# Patient Record
Sex: FEMALE | Race: WHITE | NOT HISPANIC OR LATINO | Employment: FULL TIME | URBAN - METROPOLITAN AREA
[De-identification: names, ages, dates, MRNs, and addresses within clinical notes are randomized per-mention and may not be internally consistent; named-entity substitution may affect disease eponyms.]

---

## 2023-04-24 LAB
ALANINE AMINOTRANSFERASE (SGPT) (U/L) IN SER/PLAS: 23 U/L (ref 7–45)
ALBUMIN (G/DL) IN SER/PLAS: 4.6 G/DL (ref 3.4–5)
ALKALINE PHOSPHATASE (U/L) IN SER/PLAS: 65 U/L (ref 33–110)
ANION GAP IN SER/PLAS: 10 MMOL/L (ref 10–20)
ASPARTATE AMINOTRANSFERASE (SGOT) (U/L) IN SER/PLAS: 16 U/L (ref 9–39)
BASOPHILS (10*3/UL) IN BLOOD BY AUTOMATED COUNT: 0.08 X10E9/L (ref 0–0.1)
BASOPHILS/100 LEUKOCYTES IN BLOOD BY AUTOMATED COUNT: 1.3 % (ref 0–2)
BILIRUBIN TOTAL (MG/DL) IN SER/PLAS: 0.3 MG/DL (ref 0–1.2)
CALCIDIOL (25 OH VITAMIN D3) (NG/ML) IN SER/PLAS: 30 NG/ML
CALCIUM (MG/DL) IN SER/PLAS: 9.6 MG/DL (ref 8.6–10.3)
CARBON DIOXIDE, TOTAL (MMOL/L) IN SER/PLAS: 29 MMOL/L (ref 21–32)
CHLORIDE (MMOL/L) IN SER/PLAS: 105 MMOL/L (ref 98–107)
CHOLESTEROL (MG/DL) IN SER/PLAS: 317 MG/DL (ref 0–199)
CHOLESTEROL IN HDL (MG/DL) IN SER/PLAS: 49.1 MG/DL
CHOLESTEROL/HDL RATIO: 6.5
CREATININE (MG/DL) IN SER/PLAS: 0.86 MG/DL (ref 0.5–1.05)
EOSINOPHILS (10*3/UL) IN BLOOD BY AUTOMATED COUNT: 0.35 X10E9/L (ref 0–0.7)
EOSINOPHILS/100 LEUKOCYTES IN BLOOD BY AUTOMATED COUNT: 5.7 % (ref 0–6)
ERYTHROCYTE DISTRIBUTION WIDTH (RATIO) BY AUTOMATED COUNT: 13.3 % (ref 11.5–14.5)
ERYTHROCYTE MEAN CORPUSCULAR HEMOGLOBIN CONCENTRATION (G/DL) BY AUTOMATED: 32.6 G/DL (ref 32–36)
ERYTHROCYTE MEAN CORPUSCULAR VOLUME (FL) BY AUTOMATED COUNT: 95 FL (ref 80–100)
ERYTHROCYTES (10*6/UL) IN BLOOD BY AUTOMATED COUNT: 4.56 X10E12/L (ref 4–5.2)
GFR FEMALE: 84 ML/MIN/1.73M2
GLUCOSE (MG/DL) IN SER/PLAS: 92 MG/DL (ref 74–99)
HEMATOCRIT (%) IN BLOOD BY AUTOMATED COUNT: 43.3 % (ref 36–46)
HEMOGLOBIN (G/DL) IN BLOOD: 14.1 G/DL (ref 12–16)
IMMATURE GRANULOCYTES/100 LEUKOCYTES IN BLOOD BY AUTOMATED COUNT: 0.2 % (ref 0–0.9)
LDL: 211 MG/DL (ref 0–99)
LEUKOCYTES (10*3/UL) IN BLOOD BY AUTOMATED COUNT: 6.1 X10E9/L (ref 4.4–11.3)
LYMPHOCYTES (10*3/UL) IN BLOOD BY AUTOMATED COUNT: 2.51 X10E9/L (ref 1.2–4.8)
LYMPHOCYTES/100 LEUKOCYTES IN BLOOD BY AUTOMATED COUNT: 40.9 % (ref 13–44)
MONOCYTES (10*3/UL) IN BLOOD BY AUTOMATED COUNT: 0.54 X10E9/L (ref 0.1–1)
MONOCYTES/100 LEUKOCYTES IN BLOOD BY AUTOMATED COUNT: 8.8 % (ref 2–10)
NEUTROPHILS (10*3/UL) IN BLOOD BY AUTOMATED COUNT: 2.64 X10E9/L (ref 1.2–7.7)
NEUTROPHILS/100 LEUKOCYTES IN BLOOD BY AUTOMATED COUNT: 43.1 % (ref 40–80)
NON HDL CHOLESTEROL: 268 MG/DL
PLATELETS (10*3/UL) IN BLOOD AUTOMATED COUNT: 300 X10E9/L (ref 150–450)
POTASSIUM (MMOL/L) IN SER/PLAS: 4.3 MMOL/L (ref 3.5–5.3)
PROTEIN TOTAL: 7.6 G/DL (ref 6.4–8.2)
SODIUM (MMOL/L) IN SER/PLAS: 140 MMOL/L (ref 136–145)
THYROTROPIN (MIU/L) IN SER/PLAS BY DETECTION LIMIT <= 0.05 MIU/L: 1.44 MIU/L (ref 0.44–3.98)
TRIGLYCERIDE (MG/DL) IN SER/PLAS: 287 MG/DL (ref 0–149)
UREA NITROGEN (MG/DL) IN SER/PLAS: 13 MG/DL (ref 6–23)
VLDL: 57 MG/DL (ref 0–40)

## 2023-04-25 NOTE — RESULT ENCOUNTER NOTE
Cholesterol is quite high- I recommend healthy diet low in fat, low in cholesterol, carbohydrate controlled diet (no more then 100 grams per day), regular exercise (150 minutes of activity per week that gets heart rate up and produces sweat. Achieving a healthy weight for height (BMI), alcohol consumption to no more then 1 drink per day for women and 2 drinks per day for men and no smoking. I would like to recheck levels in 3 months and if not improved would recommend starting a medication to help lower

## 2023-05-12 DIAGNOSIS — Z13.220 LIPID SCREENING: Primary | ICD-10-CM

## 2023-06-20 DIAGNOSIS — F41.9 ANXIETY: Primary | ICD-10-CM

## 2023-06-20 RX ORDER — LORAZEPAM 0.5 MG/1
0.5 TABLET ORAL 2 TIMES DAILY PRN
Qty: 60 TABLET | Refills: 2 | Status: SHIPPED | OUTPATIENT
Start: 2023-06-20 | End: 2023-12-22 | Stop reason: SDUPTHER

## 2023-06-20 RX ORDER — LORAZEPAM 0.5 MG/1
0.5 TABLET ORAL 2 TIMES DAILY PRN
COMMUNITY
End: 2023-06-20 | Stop reason: SDUPTHER

## 2023-08-28 ENCOUNTER — TELEPHONE (OUTPATIENT)
Dept: PRIMARY CARE | Facility: CLINIC | Age: 48
End: 2023-08-28
Payer: COMMERCIAL

## 2023-08-28 NOTE — TELEPHONE ENCOUNTER
Orly, [facility] is out and they asked her to contact her provider to see if it can be switched to something else. She receives her meds through the mail due to ins.

## 2023-09-29 ENCOUNTER — OFFICE VISIT (OUTPATIENT)
Dept: PRIMARY CARE | Facility: CLINIC | Age: 48
End: 2023-09-29
Payer: COMMERCIAL

## 2023-09-29 VITALS
WEIGHT: 187.4 LBS | HEIGHT: 64 IN | DIASTOLIC BLOOD PRESSURE: 72 MMHG | SYSTOLIC BLOOD PRESSURE: 118 MMHG | BODY MASS INDEX: 31.99 KG/M2 | HEART RATE: 70 BPM

## 2023-09-29 DIAGNOSIS — K21.9 GASTROESOPHAGEAL REFLUX DISEASE, UNSPECIFIED WHETHER ESOPHAGITIS PRESENT: ICD-10-CM

## 2023-09-29 DIAGNOSIS — Z79.899 HIGH RISK MEDICATION USE: ICD-10-CM

## 2023-09-29 DIAGNOSIS — E66.9 OBESITY (BMI 30-39.9): Primary | ICD-10-CM

## 2023-09-29 DIAGNOSIS — M54.6 ACUTE MIDLINE THORACIC BACK PAIN: ICD-10-CM

## 2023-09-29 PROBLEM — F41.9 ANXIETY: Status: ACTIVE | Noted: 2023-09-29

## 2023-09-29 PROBLEM — I49.3 PVC (PREMATURE VENTRICULAR CONTRACTION): Status: ACTIVE | Noted: 2023-09-29

## 2023-09-29 PROBLEM — E55.9 VITAMIN D DEFICIENCY: Status: ACTIVE | Noted: 2023-09-29

## 2023-09-29 PROBLEM — F41.0 PANIC ATTACKS: Status: ACTIVE | Noted: 2023-09-29

## 2023-09-29 LAB
AMPHETAMINE (PRESENCE) IN URINE BY SCREEN METHOD: NORMAL
BARBITURATES PRESENCE IN URINE BY SCREEN METHOD: NORMAL
BENZODIAZEPINE (PRESENCE) IN URINE BY SCREEN METHOD: NORMAL
CANNABINOIDS IN URINE BY SCREEN METHOD: NORMAL
COCAINE (PRESENCE) IN URINE BY SCREEN METHOD: NORMAL
DRUG SCREEN COMMENT URINE: NORMAL
FENTANYL URINE: NORMAL
OPIATES (PRESENCE) IN URINE BY SCREEN METHOD: NORMAL
OXYCODONE (PRESENCE) IN URINE BY SCREEN METHOD: NORMAL
PHENCYCLIDINE (PRESENCE) IN URINE BY SCREEN METHOD: NORMAL

## 2023-09-29 PROCEDURE — 80307 DRUG TEST PRSMV CHEM ANLYZR: CPT

## 2023-09-29 PROCEDURE — 99215 OFFICE O/P EST HI 40 MIN: CPT | Performed by: FAMILY MEDICINE

## 2023-09-29 RX ORDER — DULOXETIN HYDROCHLORIDE 30 MG/1
1 CAPSULE, DELAYED RELEASE ORAL DAILY
COMMUNITY
Start: 2021-03-17 | End: 2024-05-21 | Stop reason: SDUPTHER

## 2023-09-29 RX ORDER — OMEPRAZOLE 20 MG/1
20 CAPSULE, DELAYED RELEASE ORAL DAILY
Qty: 30 CAPSULE | Refills: 1 | Status: SHIPPED | OUTPATIENT
Start: 2023-09-29 | End: 2023-11-28

## 2023-09-29 RX ORDER — PHENTERMINE HYDROCHLORIDE 37.5 MG/1
37.5 TABLET ORAL
Qty: 30 TABLET | Refills: 0 | Status: SHIPPED | OUTPATIENT
Start: 2023-09-29 | End: 2023-10-29

## 2023-09-29 ASSESSMENT — ENCOUNTER SYMPTOMS
WEAKNESS: 0
NAUSEA: 0
SHORTNESS OF BREATH: 0
DIZZINESS: 0
COUGH: 0
FATIGUE: 0
VOMITING: 0
ARTHRALGIAS: 0
CONSTIPATION: 0
FEVER: 0
DYSPHORIC MOOD: 0
DIARRHEA: 0
HEADACHES: 0
BACK PAIN: 0
ABDOMINAL PAIN: 0
CHILLS: 0
NERVOUS/ANXIOUS: 1
MYALGIAS: 0
PALPITATIONS: 0

## 2023-09-29 NOTE — PROGRESS NOTES
Subjective   Patient ID: Cinthya Thomas is a 48 y.o. female who presents for Follow-up.    OARRS:  Nuria Andrea, MAYA-JACKSON on 9/29/2023  2:07 PM  I have personally reviewed the OARRS report for Cinthya Thomas. I have considered the risks of abuse, dependence, addiction and diversion and I believe that it is clinically appropriate for Cinthya Thomas to be prescribed this medication    Is the patient prescribed a combination of a benzodiazepine and opioid?  No    Last Urine Drug Screen / ordered today: Yes  No results found for this or any previous visit (from the past 8760 hour(s)).  Results are as expected.       Controlled Substance Agreement:  Date of the Last Agreement: 9/29/23  Reviewed Controlled Substance Agreement including but not limited to the benefits, risks, and alternatives to treatment with a Controlled Substance medication(s).    Benzodiazepines:  What is the patient's goal of therapy? To reduce situational anxiety  Is this being achieved with current treatment? yes    Activities of Daily Living:   Is your overall impression that this patient is benefiting (symptom reduction outweighs side effects) from benzodiazepine therapy? Yes     1. Physical Functioning: Better  2. Family Relationship: Better  3. Social Relationship: Better  4. Mood: Better  5. Sleep Patterns: Better  6. Overall Function: Better     Stimulants:   What is the patient's goal of therapy? To lose weight  Is this being achieved with current treatment? yes    Activities of Daily Living:   Is your overall impression that this patient is benefiting (symptom reduction outweighs side effects) from stimulant therapy? Yes     1. Physical Functioning: Same  2. Family Relationship: Same  3. Social Relationship: Same  4. Mood: Same  5. Sleep Patterns: Same  6. Overall Function: Same      Presents for follow up of anxiety with concerns of mid posterior back pain, is intermittent, was seen in ER for this and work up was  "unremarkable. Describes as midline between shoulder blades pain that radiates outward. Does have history of GERD and has been using TUMS for this. Uncertain of aggravating or relieving factors for back pain. Denies chest pain, shortness of breath, cough, orthopnea, nausea, vomiting. Describes the back pain was really bad the other day after she had eaten tacos but did not find relief with tums.     Intermittent thigh numbness that occurs with activity and is relieved with rest, feels this has started since she has had weight gain. Also has intermittent numbness in hands with activity and relieved with rest. Neither limit her activities    Is frustrated with weight gain that seemed to start after childbirth, was thin up until then. Goes to the gym 4 days a week 30 minutes to an hour each visit, restricts carbohydrates and sugars. Diet is very healthy. Weight gain is interfering with her social activities.         Review of Systems   Constitutional:  Negative for chills, fatigue and fever.   Eyes:  Negative for visual disturbance.   Respiratory:  Negative for cough and shortness of breath.    Cardiovascular:  Negative for chest pain and palpitations.   Gastrointestinal:  Negative for abdominal pain, constipation, diarrhea, nausea and vomiting.   Musculoskeletal:  Negative for arthralgias, back pain and myalgias.   Skin:  Negative for rash.   Neurological:  Negative for dizziness, syncope, weakness and headaches.   Psychiatric/Behavioral:  Negative for dysphoric mood. The patient is nervous/anxious.        Objective   /72   Ht 1.626 m (5' 4\")   Wt 85 kg (187 lb 6.4 oz)   BMI 32.17 kg/m²     Physical Exam  Constitutional:       Appearance: Normal appearance.   HENT:      Head: Normocephalic and atraumatic.   Cardiovascular:      Rate and Rhythm: Normal rate and regular rhythm.      Heart sounds: No murmur heard.     No gallop.   Pulmonary:      Effort: Pulmonary effort is normal. No respiratory distress.      " Breath sounds: Normal breath sounds.   Abdominal:      General: Bowel sounds are normal. There is no distension.      Tenderness: There is no abdominal tenderness.   Musculoskeletal:         General: Normal range of motion.   Skin:     General: Skin is warm and dry.      Findings: No lesion or rash.   Neurological:      General: No focal deficit present.      Mental Status: She is alert and oriented to person, place, and time. Mental status is at baseline.   Psychiatric:         Mood and Affect: Mood normal. Affect is tearful.         Behavior: Behavior normal.         Assessment/Plan   Problem List Items Addressed This Visit    None  Visit Diagnoses         Codes    Obesity (BMI 30-39.9)    -  Primary E66.9    Relevant Medications    phentermine (Adipex-P) 37.5 mg tablet    Gastroesophageal reflux disease, unspecified whether esophagitis present     K21.9    Relevant Medications    omeprazole (PriLOSEC) 20 mg DR capsule    Acute midline thoracic back pain     M54.6    High risk medication use     Z79.899    Relevant Orders    Drug Screen, Urine With Reflex to Confirmation

## 2023-09-29 NOTE — PATIENT INSTRUCTIONS
You have been prescribed: phentermine for weight loss.   This medication will decrease your appetite. I recommend trying a tracking connor like [employer] or [employer]    This medication may not be covered by insurance plan. Expect to pay $20-30 per month. Prior authorizations are not completed for this medication.    **PLEASE READ, VERY IMPORTANT**    *You need to be seen within 30 days of your last appointment for a new prescription.     *LOST AND MISPLACED PRESCRIPTIONS WILL NOT BE REPLACED. YOU WILL NEED TO WAIT 6 MONTHS TO RESUME THE MEDICATION    *This medication will cause a positive AMPHETAMINE result on a drug screen    Side Effects include: difficulty sleeping, dry mouth, constipation, increased heart rate blood pressure, heart palpitations.   For difficulty sleeping, take the medication in the am for difficulty sleeping. Try Melatonin as a sleep aide.  For constipation, take fiber or daily stool softeners for constipation, increase fluid intake.    Diet:  Keep a food journal and log everything you eat and drink. You can use a phone applications like [employer], [employer] it, a notebook, or the provided meal calendar.   Eat between 1500 to 1800 calories per day.   Have less than 100 grams of carbs per day. Carbs include: bread, pasta, cakes, cookies, rice, cereal, fruit drinks, juice, soda and fruit.   Have no more than 1 fruit per day.   Have 3 meals and 1 snack.   Each meal should have 3-4 oz of protein and vegetables.   Limit starches.    Exercise:   Aim for 30 minutes per day, 5 days per week to start.  Then increase to 60 minutes per day, 5 days a week.      Return to the clinic: 3-4 weeks

## 2023-10-30 ENCOUNTER — TELEPHONE (OUTPATIENT)
Dept: PRIMARY CARE | Facility: CLINIC | Age: 48
End: 2023-10-30
Payer: COMMERCIAL

## 2023-11-07 ENCOUNTER — APPOINTMENT (OUTPATIENT)
Dept: OBSTETRICS AND GYNECOLOGY | Facility: CLINIC | Age: 48
End: 2023-11-07
Payer: COMMERCIAL

## 2023-11-09 ENCOUNTER — OFFICE VISIT (OUTPATIENT)
Dept: PRIMARY CARE | Facility: CLINIC | Age: 48
End: 2023-11-09
Payer: COMMERCIAL

## 2023-11-09 VITALS
HEIGHT: 64 IN | WEIGHT: 185 LBS | BODY MASS INDEX: 31.58 KG/M2 | SYSTOLIC BLOOD PRESSURE: 120 MMHG | DIASTOLIC BLOOD PRESSURE: 82 MMHG | HEART RATE: 87 BPM

## 2023-11-09 DIAGNOSIS — E66.9 OBESITY (BMI 30-39.9): ICD-10-CM

## 2023-11-09 DIAGNOSIS — Z12.31 BREAST CANCER SCREENING BY MAMMOGRAM: Primary | ICD-10-CM

## 2023-11-09 PROCEDURE — 99214 OFFICE O/P EST MOD 30 MIN: CPT | Performed by: FAMILY MEDICINE

## 2023-11-09 RX ORDER — PHENTERMINE HYDROCHLORIDE 37.5 MG/1
37.5 TABLET ORAL
Qty: 30 TABLET | Refills: 0 | Status: SHIPPED | OUTPATIENT
Start: 2023-11-09 | End: 2024-02-28 | Stop reason: ALTCHOICE

## 2023-11-09 ASSESSMENT — ENCOUNTER SYMPTOMS
FATIGUE: 0
WEAKNESS: 0
CONSTIPATION: 0
MYALGIAS: 0
CHILLS: 0
DIZZINESS: 0
DYSPHORIC MOOD: 0
PALPITATIONS: 0
ARTHRALGIAS: 0
LOSS OF SENSATION IN FEET: 0
VOMITING: 0
SHORTNESS OF BREATH: 0
NAUSEA: 0
NERVOUS/ANXIOUS: 0
OCCASIONAL FEELINGS OF UNSTEADINESS: 0
ABDOMINAL PAIN: 0
HEADACHES: 0
DEPRESSION: 0
DIARRHEA: 0
FEVER: 0
COUGH: 0
BACK PAIN: 0

## 2023-11-09 ASSESSMENT — PATIENT HEALTH QUESTIONNAIRE - PHQ9
1. LITTLE INTEREST OR PLEASURE IN DOING THINGS: NOT AT ALL
SUM OF ALL RESPONSES TO PHQ9 QUESTIONS 1 AND 2: 0
2. FEELING DOWN, DEPRESSED OR HOPELESS: NOT AT ALL

## 2023-11-09 ASSESSMENT — COLUMBIA-SUICIDE SEVERITY RATING SCALE - C-SSRS
2. HAVE YOU ACTUALLY HAD ANY THOUGHTS OF KILLING YOURSELF?: NO
6. HAVE YOU EVER DONE ANYTHING, STARTED TO DO ANYTHING, OR PREPARED TO DO ANYTHING TO END YOUR LIFE?: NO
1. IN THE PAST MONTH, HAVE YOU WISHED YOU WERE DEAD OR WISHED YOU COULD GO TO SLEEP AND NOT WAKE UP?: NO

## 2023-11-09 NOTE — PROGRESS NOTES
Subjective   Patient ID: Cinthya Thomas is a 48 y.o. female who presents for Follow-up (Follow up).    OARRS:  MAYA Spears-CNP on 11/9/2023  4:44 PM  I have personally reviewed the OARRS report for Cinthya Thomas. I have considered the risks of abuse, dependence, addiction and diversion and I believe that it is clinically appropriate for Cinthya Thomas to be prescribed this medication    Is the patient prescribed a combination of a benzodiazepine and opioid?  No    Last Urine Drug Screen / ordered today: Yes  Recent Results (from the past 8760 hour(s))   Drug Screen, Urine With Reflex to Confirmation    Collection Time: 09/29/23  6:00 PM   Result Value Ref Range    DRUG SCREEN COMMENT URINE SEE BELOW     Amphetamine Screen, Urine PRESUMPTIVE NEGATIVE NEGATIVE    Barbiturate Screen, Urine PRESUMPTIVE NEGATIVE NEGATIVE    BENZODIAZEPINE (PRESENCE) IN URINE BY SCREEN METHOD PRESUMPTIVE NEGATIVE NEGATIVE    Cannabinoid Screen, Urine PRESUMPTIVE NEGATIVE NEGATIVE    Cocaine Screen, Urine PRESUMPTIVE NEGATIVE NEGATIVE    Fentanyl, Ur PRESUMPTIVE NEGATIVE NEGATIVE    Opiate Screen, Urine PRESUMPTIVE NEGATIVE NEGATIVE    Oxycodone Screen, Ur PRESUMPTIVE NEGATIVE NEGATIVE    PCP Screen, Urine PRESUMPTIVE NEGATIVE NEGATIVE     Results are as expected.       Controlled Substance Agreement:  Date of the Last Agreement: 9/29/23  Reviewed Controlled Substance Agreement including but not limited to the benefits, risks, and alternatives to treatment with a Controlled Substance medication(s).    Benzodiazepines:  What is the patient's goal of therapy? To reduce situational anxiety  Is this being achieved with current treatment? yes    Activities of Daily Living:   Is your overall impression that this patient is benefiting (symptom reduction outweighs side effects) from benzodiazepine therapy? Yes     1. Physical Functioning: Better  2. Family Relationship: Better  3. Social Relationship: Better  4. Mood:  Better  5. Sleep Patterns: Better  6. Overall Function: Better     Stimulants:   What is the patient's goal of therapy? To lose weight  Is this being achieved with current treatment? yes    Activities of Daily Living:   Is your overall impression that this patient is benefiting (symptom reduction outweighs side effects) from stimulant therapy? Yes     1. Physical Functioning: Same  2. Family Relationship: Same  3. Social Relationship: Same  4. Mood: Same  5. Sleep Patterns: Same  6. Overall Function: Same      anxiety is doing much better and is able to use ativan much less then she was previously. Is able to attend social settings without having to take lorazepam first.     Acid reflux-has been on omeprazole and is taking daily, has not had any heart burn and pain in back is gone. Discussed stopping the PPI and if symptoms return will refer to GI for evaluation for EGD.    Intermittent thigh numbness that occurs with activity and is relieved with rest, feels this has started since she has had weight gain. Also has intermittent numbness in hands with activity and relieved with rest. Neither limit her activities. These things remain the same, no worse    Was started on phentermine- is down 2 pounds since last visit. Continues to go to the gym 4 days a week 30 minutes to an hour each visit, restricts carbohydrates and sugars. Diet is very healthy. Feels more motivated, had decreased appetite and has not had any negative side effects         Review of Systems   Constitutional:  Negative for chills, fatigue and fever.   Eyes:  Negative for visual disturbance.   Respiratory:  Negative for cough and shortness of breath.    Cardiovascular:  Negative for chest pain and palpitations.   Gastrointestinal:  Negative for abdominal pain, constipation, diarrhea, nausea and vomiting.   Musculoskeletal:  Negative for arthralgias, back pain and myalgias.   Skin:  Negative for rash.   Neurological:  Negative for dizziness, syncope,  "weakness and headaches.   Psychiatric/Behavioral:  Negative for dysphoric mood. The patient is not nervous/anxious.        Objective   /82 (BP Location: Right arm, Patient Position: Sitting)   Pulse 87   Ht 1.626 m (5' 4\")   Wt 83.9 kg (185 lb)   BMI 31.76 kg/m²     Physical Exam  Constitutional:       Appearance: Normal appearance.   HENT:      Head: Normocephalic and atraumatic.   Cardiovascular:      Rate and Rhythm: Normal rate and regular rhythm.      Heart sounds: No murmur heard.     No gallop.   Pulmonary:      Effort: Pulmonary effort is normal. No respiratory distress.      Breath sounds: Normal breath sounds.   Abdominal:      General: Bowel sounds are normal. There is no distension.      Tenderness: There is no abdominal tenderness.   Musculoskeletal:         General: Normal range of motion.   Skin:     General: Skin is warm and dry.      Findings: No lesion or rash.   Neurological:      General: No focal deficit present.      Mental Status: She is alert and oriented to person, place, and time. Mental status is at baseline.   Psychiatric:         Mood and Affect: Mood normal. Affect is tearful.         Behavior: Behavior normal.         Assessment/Plan   Problem List Items Addressed This Visit    None  Visit Diagnoses         Codes    Breast cancer screening by mammogram    -  Primary Z12.31    Relevant Orders    BI mammo bilateral screening tomosynthesis    Obesity (BMI 30-39.9)     E66.9    Relevant Medications    phentermine (Adipex-P) 37.5 mg tablet    Other Relevant Orders    Follow Up In [facility] - PCP - Established             "

## 2023-11-09 NOTE — PATIENT INSTRUCTIONS
1. Breast cancer screening by mammogram  - BI mammo bilateral screening tomosynthesis; Future    2. Obesity (BMI 30-39.9)  - phentermine (Adipex-P) 37.5 mg tablet; Take 1 tablet (37.5 mg) by mouth once daily in the morning. Take before meals.  Dispense: 30 tablet; Refill: 0

## 2023-11-15 PROBLEM — Z01.419 ENCOUNTER FOR ANNUAL ROUTINE GYNECOLOGICAL EXAMINATION: Status: ACTIVE | Noted: 2023-11-15

## 2023-11-15 ASSESSMENT — ENCOUNTER SYMPTOMS
CONSTIPATION: 0
FATIGUE: 0
DIFFICULTY URINATING: 0
CHILLS: 0
SHORTNESS OF BREATH: 0
SLEEP DISTURBANCE: 0
DIZZINESS: 0
BACK PAIN: 0
ADENOPATHY: 0
UNEXPECTED WEIGHT CHANGE: 0
NERVOUS/ANXIOUS: 0
DYSURIA: 0
JOINT SWELLING: 0
COUGH: 0
BLOOD IN STOOL: 0
DYSPHORIC MOOD: 0
AGITATION: 0
HEMATURIA: 0
VOMITING: 0
FLANK PAIN: 0
CHEST TIGHTNESS: 0
FREQUENCY: 0
FEVER: 0
HEADACHES: 0
NAUSEA: 0
DIARRHEA: 0
ABDOMINAL PAIN: 0
MYALGIAS: 0

## 2023-11-16 NOTE — PROGRESS NOTES
Subjective   Patient ID: Cinthya Thomas is a 48 y.o. female who presents for No chief complaint on file..  HPI 48 y.o.  (  has had vasectomy ) here for yearly.  History of dysmenorrhea.  She says that she has not had a cycle since 2020.  However, she had a period  Last month and is saying completely normal 5 days normal flow no pain no cramping.    Review of Systems   Constitutional:  Negative for chills, fatigue, fever and unexpected weight change.   Respiratory:  Negative for cough, chest tightness and shortness of breath.    Cardiovascular:  Negative for chest pain.   Gastrointestinal:  Negative for abdominal pain, blood in stool, constipation, diarrhea, nausea and vomiting.   Genitourinary:  Negative for decreased urine volume, difficulty urinating, dysuria, enuresis, flank pain, frequency, genital sores, hematuria and urgency.        Mild incontinence    Musculoskeletal:  Negative for back pain, joint swelling and myalgias.   Skin:  Negative for rash.   Neurological:  Negative for dizziness and headaches.   Hematological:  Negative for adenopathy.   Psychiatric/Behavioral:  Negative for agitation, dysphoric mood and sleep disturbance. The patient is not nervous/anxious.        Objective   Physical Exam  Vitals reviewed.   Constitutional:       Appearance: Normal appearance.   HENT:      Head: Normocephalic and atraumatic.      Nose: Nose normal.   Cardiovascular:      Rate and Rhythm: Normal rate and regular rhythm.   Pulmonary:      Effort: Pulmonary effort is normal.      Breath sounds: Normal breath sounds.   Chest:      Chest wall: No mass.   Breasts:     Right: Normal.      Left: Normal.   Abdominal:      General: Abdomen is flat. Bowel sounds are normal. There is no distension.      Palpations: Abdomen is soft. There is no mass.   Genitourinary:     General: Normal vulva.      Vagina: Normal.      Cervix: Normal.      Uterus: Normal.       Adnexa: Right adnexa normal and left adnexa  normal.      Rectum: Normal.   Musculoskeletal:         General: Normal range of motion.      Cervical back: Normal range of motion.   Skin:     General: Skin is warm and dry.   Neurological:      General: No focal deficit present.      Mental Status: She is alert.   Psychiatric:         Mood and Affect: Mood normal.         Behavior: Behavior normal.       Assessment/Plan   Problem List Items Addressed This Visit             ICD-10-CM    Encounter for annual routine gynecological examination - Primary Z01.419     Pap smear was done today.  She saysthat a mammogram and Cologuard has been ordered by her primary care already.    She was encouraged to do self breast exam monthly,and  monitor her cycles for now.  If she has persistent prolonged heavy bleeding or persistent spotting then she should come back for evaluation otherwise follow-up is in 1 year.    She was encouraged to quit smoking, I offered her patches but to she is not likely to quit smoking at this point.  She was encouraged weightloss, healthydiet and exercise regularly.  Follow-up in 1 year.

## 2023-11-17 ENCOUNTER — OFFICE VISIT (OUTPATIENT)
Dept: OBSTETRICS AND GYNECOLOGY | Facility: CLINIC | Age: 48
End: 2023-11-17
Payer: COMMERCIAL

## 2023-11-17 VITALS
SYSTOLIC BLOOD PRESSURE: 140 MMHG | BODY MASS INDEX: 30.82 KG/M2 | DIASTOLIC BLOOD PRESSURE: 88 MMHG | HEIGHT: 65 IN | WEIGHT: 185 LBS

## 2023-11-17 DIAGNOSIS — Z01.419 ENCOUNTER FOR ANNUAL ROUTINE GYNECOLOGICAL EXAMINATION: Primary | ICD-10-CM

## 2023-11-17 PROCEDURE — 87624 HPV HI-RISK TYP POOLED RSLT: CPT

## 2023-11-17 PROCEDURE — 88175 CYTOPATH C/V AUTO FLUID REDO: CPT

## 2023-11-17 PROCEDURE — 99396 PREV VISIT EST AGE 40-64: CPT | Performed by: OBSTETRICS & GYNECOLOGY

## 2023-12-05 LAB
CYTOLOGY CMNT CVX/VAG CYTO-IMP: NORMAL
HPV HR 12 DNA GENITAL QL NAA+PROBE: NEGATIVE
HPV HR GENOTYPES PNL CVX NAA+PROBE: NEGATIVE
HPV16 DNA SPEC QL NAA+PROBE: NEGATIVE
HPV18 DNA SPEC QL NAA+PROBE: NEGATIVE
LAB AP HPV GENOTYPE QUESTION: YES
LAB AP HPV HR: NORMAL
LABORATORY COMMENT REPORT: NORMAL
PATH REPORT.TOTAL CANCER: NORMAL

## 2023-12-13 ENCOUNTER — APPOINTMENT (OUTPATIENT)
Dept: PRIMARY CARE | Facility: CLINIC | Age: 48
End: 2023-12-13
Payer: COMMERCIAL

## 2023-12-22 ENCOUNTER — TELEPHONE (OUTPATIENT)
Dept: PRIMARY CARE | Facility: CLINIC | Age: 48
End: 2023-12-22
Payer: COMMERCIAL

## 2023-12-22 DIAGNOSIS — F41.9 ANXIETY: ICD-10-CM

## 2023-12-22 RX ORDER — LORAZEPAM 0.5 MG/1
0.5 TABLET ORAL 2 TIMES DAILY PRN
Qty: 60 TABLET | Refills: 2 | Status: SHIPPED | OUTPATIENT
Start: 2023-12-22 | End: 2024-03-21

## 2023-12-22 NOTE — TELEPHONE ENCOUNTER
Refill request:  Lorazepam 0.5mg   1/2 tab as needed   Pharmacy: [facility] not mail order this time only

## 2024-01-24 ENCOUNTER — APPOINTMENT (OUTPATIENT)
Dept: PRIMARY CARE | Facility: CLINIC | Age: 49
End: 2024-01-24
Payer: COMMERCIAL

## 2024-01-24 DIAGNOSIS — F41.8 DEPRESSION WITH ANXIETY: ICD-10-CM

## 2024-01-24 RX ORDER — BUPROPION HYDROCHLORIDE 300 MG/1
TABLET ORAL
Qty: 90 TABLET | Refills: 3 | Status: SHIPPED | OUTPATIENT
Start: 2024-01-24

## 2024-02-09 ENCOUNTER — HOSPITAL ENCOUNTER (OUTPATIENT)
Dept: RADIOLOGY | Facility: HOSPITAL | Age: 49
Discharge: HOME | End: 2024-02-09
Payer: COMMERCIAL

## 2024-02-09 DIAGNOSIS — Z12.31 BREAST CANCER SCREENING BY MAMMOGRAM: ICD-10-CM

## 2024-02-09 PROCEDURE — 77063 BREAST TOMOSYNTHESIS BI: CPT | Performed by: RADIOLOGY

## 2024-02-09 PROCEDURE — 77067 SCR MAMMO BI INCL CAD: CPT | Performed by: RADIOLOGY

## 2024-02-09 PROCEDURE — 77067 SCR MAMMO BI INCL CAD: CPT

## 2024-02-28 ENCOUNTER — OFFICE VISIT (OUTPATIENT)
Dept: PRIMARY CARE | Facility: CLINIC | Age: 49
End: 2024-02-28
Payer: COMMERCIAL

## 2024-02-28 VITALS
SYSTOLIC BLOOD PRESSURE: 112 MMHG | WEIGHT: 191 LBS | HEIGHT: 64 IN | DIASTOLIC BLOOD PRESSURE: 78 MMHG | HEART RATE: 91 BPM | BODY MASS INDEX: 32.61 KG/M2

## 2024-02-28 DIAGNOSIS — Z13.220 LIPID SCREENING: ICD-10-CM

## 2024-02-28 DIAGNOSIS — R06.83 HABITUAL SNORING: Primary | ICD-10-CM

## 2024-02-28 DIAGNOSIS — Z00.00 HEALTHCARE MAINTENANCE: ICD-10-CM

## 2024-02-28 DIAGNOSIS — E66.9 OBESITY (BMI 30-39.9): ICD-10-CM

## 2024-02-28 PROCEDURE — 99213 OFFICE O/P EST LOW 20 MIN: CPT | Performed by: FAMILY MEDICINE

## 2024-02-28 ASSESSMENT — ANXIETY QUESTIONNAIRES
6. BECOMING EASILY ANNOYED OR IRRITABLE: NOT AT ALL
IF YOU CHECKED OFF ANY PROBLEMS ON THIS QUESTIONNAIRE, HOW DIFFICULT HAVE THESE PROBLEMS MADE IT FOR YOU TO DO YOUR WORK, TAKE CARE OF THINGS AT HOME, OR GET ALONG WITH OTHER PEOPLE: NOT DIFFICULT AT ALL
7. FEELING AFRAID AS IF SOMETHING AWFUL MIGHT HAPPEN: NOT AT ALL
2. NOT BEING ABLE TO STOP OR CONTROL WORRYING: SEVERAL DAYS
1. FEELING NERVOUS, ANXIOUS, OR ON EDGE: NEARLY EVERY DAY
5. BEING SO RESTLESS THAT IT IS HARD TO SIT STILL: SEVERAL DAYS
4. TROUBLE RELAXING: SEVERAL DAYS
3. WORRYING TOO MUCH ABOUT DIFFERENT THINGS: NEARLY EVERY DAY
GAD7 TOTAL SCORE: 9

## 2024-02-28 ASSESSMENT — COLUMBIA-SUICIDE SEVERITY RATING SCALE - C-SSRS
1. IN THE PAST MONTH, HAVE YOU WISHED YOU WERE DEAD OR WISHED YOU COULD GO TO SLEEP AND NOT WAKE UP?: NO
2. HAVE YOU ACTUALLY HAD ANY THOUGHTS OF KILLING YOURSELF?: NO
6. HAVE YOU EVER DONE ANYTHING, STARTED TO DO ANYTHING, OR PREPARED TO DO ANYTHING TO END YOUR LIFE?: NO

## 2024-02-28 ASSESSMENT — PATIENT HEALTH QUESTIONNAIRE - PHQ9
2. FEELING DOWN, DEPRESSED OR HOPELESS: NOT AT ALL
SUM OF ALL RESPONSES TO PHQ9 QUESTIONS 1 AND 2: 0
1. LITTLE INTEREST OR PLEASURE IN DOING THINGS: NOT AT ALL

## 2024-02-28 ASSESSMENT — ENCOUNTER SYMPTOMS
OCCASIONAL FEELINGS OF UNSTEADINESS: 0
DEPRESSION: 0
LOSS OF SENSATION IN FEET: 0

## 2024-02-28 NOTE — PROGRESS NOTES
"Subjective   Patient ID: Cinthya Thomas is a 48 y.o. female who presents for Follow-up (Patient here for follow up, stopped phentermine because she didn't like how she felt,snoring and talk about ativan).    Presents for follow up    Is no longer taking phentermine. Has stopped smoking. Has gained some weight but does endorse that she has been snacking more since she has stopped smoking. Continues to work out. Continues to take xanax as needed but has not needed it as often as she had been taking it. Does report her  has told her she has been snoring especially over the last 6 months. Does not drink alcohol or take sedatives before bed. Denies HA, visual changes, SOB, chest pain, lightheadedness or dizziness          Review of Systems   All other systems reviewed and are negative.      Objective   /78 (BP Location: Right arm, Patient Position: Sitting)   Pulse 91   Ht 1.626 m (5' 4\")   Wt 86.6 kg (191 lb)   BMI 32.79 kg/m²     Physical Exam  Constitutional:       Appearance: Normal appearance.   HENT:      Head: Normocephalic and atraumatic.   Cardiovascular:      Rate and Rhythm: Normal rate and regular rhythm.      Heart sounds: No murmur heard.     No gallop.   Pulmonary:      Effort: Pulmonary effort is normal. No respiratory distress.      Breath sounds: Normal breath sounds.   Musculoskeletal:         General: Normal range of motion.   Skin:     General: Skin is warm and dry.      Findings: No lesion or rash.   Neurological:      General: No focal deficit present.      Mental Status: She is alert and oriented to person, place, and time. Mental status is at baseline.   Psychiatric:         Mood and Affect: Mood normal.         Behavior: Behavior normal.         Assessment/Plan   Problem List Items Addressed This Visit    None  Visit Diagnoses         Codes    Habitual snoring    -  Primary R06.83    Relevant Orders    Home sleep apnea test (HSAT)    Obesity (BMI 30-39.9)     E66.9    Lipid " screening     Z13.220    Relevant Orders    Lipid Panel    Healthcare maintenance     Z00.00    Relevant Orders    TSH with reflex to Free T4 if abnormal    Comprehensive Metabolic Panel    CBC and Auto Differential    Hemoglobin A1C

## 2024-03-08 ENCOUNTER — CLINICAL SUPPORT (OUTPATIENT)
Dept: SLEEP MEDICINE | Facility: HOSPITAL | Age: 49
End: 2024-03-08
Payer: COMMERCIAL

## 2024-03-08 DIAGNOSIS — R06.83 HABITUAL SNORING: ICD-10-CM

## 2024-03-08 PROCEDURE — 95806 SLEEP STUDY UNATT&RESP EFFT: CPT | Performed by: INTERNAL MEDICINE

## 2024-03-19 ENCOUNTER — TELEPHONE (OUTPATIENT)
Dept: PRIMARY CARE | Facility: CLINIC | Age: 49
End: 2024-03-19
Payer: COMMERCIAL

## 2024-03-19 NOTE — TELEPHONE ENCOUNTER
----- Message from PETER Spears sent at 3/19/2024  1:28 PM EDT -----  Sleep study consistent for moderate-severe sleep apnea and positive airway pressure machine is indicated to treat. Please let me know if agreeable for this treatment and I will order or may return to the office for a follow up discussion and I can answer any questions.

## 2024-03-19 NOTE — RESULT ENCOUNTER NOTE
Sleep study consistent for moderate-severe sleep apnea and positive airway pressure machine is indicated to treat. Please let me know if agreeable for this treatment and I will order or may return to the office for a follow up discussion and I can answer any questions.

## 2024-03-20 DIAGNOSIS — G47.33 OSA (OBSTRUCTIVE SLEEP APNEA): Primary | ICD-10-CM

## 2024-05-17 ENCOUNTER — TELEPHONE (OUTPATIENT)
Dept: PRIMARY CARE | Facility: CLINIC | Age: 49
End: 2024-05-17
Payer: COMMERCIAL

## 2024-05-17 NOTE — TELEPHONE ENCOUNTER
Med Refill   DULoxetine (Cymbalta) 30 mg DR capsule [[id number]]     [facility] [address]  [address] [address]  Phone: [phone]  Fax: [phone]     LOV: 2/28/24    NOV: MELANIE

## 2024-05-21 DIAGNOSIS — F41.9 ANXIETY: Primary | ICD-10-CM

## 2024-05-21 RX ORDER — DULOXETIN HYDROCHLORIDE 30 MG/1
30 CAPSULE, DELAYED RELEASE ORAL DAILY
Qty: 90 CAPSULE | Refills: 3 | Status: SHIPPED | OUTPATIENT
Start: 2024-05-21 | End: 2025-05-21

## 2024-06-24 ENCOUNTER — APPOINTMENT (OUTPATIENT)
Dept: OBSTETRICS AND GYNECOLOGY | Facility: CLINIC | Age: 49
End: 2024-06-24
Payer: COMMERCIAL

## 2024-06-24 VITALS
WEIGHT: 194.4 LBS | HEIGHT: 64 IN | BODY MASS INDEX: 33.19 KG/M2 | DIASTOLIC BLOOD PRESSURE: 78 MMHG | SYSTOLIC BLOOD PRESSURE: 124 MMHG

## 2024-06-24 DIAGNOSIS — Z79.890 MENOPAUSAL SYNDROME ON HORMONE REPLACEMENT THERAPY: Primary | ICD-10-CM

## 2024-06-24 DIAGNOSIS — N95.1 MENOPAUSAL SYNDROME ON HORMONE REPLACEMENT THERAPY: Primary | ICD-10-CM

## 2024-06-24 PROCEDURE — 99204 OFFICE O/P NEW MOD 45 MIN: CPT | Performed by: NURSE PRACTITIONER

## 2024-06-24 RX ORDER — ESTRADIOL 0.05 MG/D
1 PATCH TRANSDERMAL
Qty: 4 PATCH | Refills: 11 | Status: SHIPPED | OUTPATIENT
Start: 2024-06-30 | End: 2024-06-25 | Stop reason: SDUPTHER

## 2024-06-24 RX ORDER — PROGESTERONE 100 MG/1
100 CAPSULE ORAL DAILY
Qty: 30 CAPSULE | Refills: 11 | Status: SHIPPED | OUTPATIENT
Start: 2024-06-24

## 2024-06-24 ASSESSMENT — ENCOUNTER SYMPTOMS
HEMATOLOGIC/LYMPHATIC NEGATIVE: 0
MUSCULOSKELETAL NEGATIVE: 0
ALLERGIC/IMMUNOLOGIC NEGATIVE: 0
CARDIOVASCULAR NEGATIVE: 0
RESPIRATORY NEGATIVE: 0
EYES NEGATIVE: 0
PSYCHIATRIC NEGATIVE: 0
GASTROINTESTINAL NEGATIVE: 0
CONSTITUTIONAL NEGATIVE: 0
ENDOCRINE NEGATIVE: 0
NEUROLOGICAL NEGATIVE: 0

## 2024-06-24 ASSESSMENT — PAIN SCALES - GENERAL: PAINLEVEL: 0-NO PAIN

## 2024-06-24 NOTE — PROGRESS NOTES
Subjective   Patient ID: Cinthya Thomas is a 49 y.o. female who presents for Menopause.  HPI  Concerns:   Symptoms started end of 2020: weight gain  Started yoga and weight training  Has worked with multiple providers  Normal TSH 4/2023    Menopausal age: perimenopausal  LMP 10/2023    Any Contraindications to HT: personal h/o breast cancer, estrogen sensitive cancer, dementia, stroke, MI, VTE or inherited high risk for VTE, SCAD: none  h/o congenital heart disease (increased risk of DVT) none    contraception:  vasectomy  HT: none  use of OTC remedies: none  bioidenticals: none  Was prescribed wellbutrin and ativan which she is still on; previously tried Cymbalta but had AE       VMS: yes  Sleep difficulties: yes; h/o sleep apnea  Mood changes: yes, on wellbutrin   Joint pain: yes  Brain fog/difficulty concentrating: yes    GSM: none  are you sexually active: , yes  dyspareunia: none  decrease in desire: yes; r/t negative body image  difficulty reaching orgasm:  yes  Urinary Incontinence: yes, urge         Fractures: none    Exercise:  yes  weight bearing: yes     Review of Systems    Objective   Physical Exam    Assessment/Plan   Diagnoses and all orders for this visit:  Menopausal syndrome on hormone replacement therapy  -     progesterone (Prometrium) 100 mg capsule; Take 1 capsule (100 mg) by mouth once daily. Take at bedtime  -     estradiol (Climara) 0.05 mg/24 hr patch; Place 1 patch over 7 days on the skin 1 (one) time per week.       Decision for MHT; transdermal estradiol d/t elevated triglycerides  Follow up in 6 weeks     MHT risks/benefits discussed. WHI discussed including that the average age of the women in the WHI study was 63 and it was studying if HT would reduce the risk of heart disease in women, it did not study QOL, GSM, osteoporosis, or VMS. Therefore, the findings are not necessarily applicable to women starting HT within the first 10 years of menopause.  The WHI did not find an  increase in breast cancer with combined therapy until after 3-5 years of use and there was no increased risk of breast cancer with estrogen only. However, a meta-analysis of observational cohort studies consistently found an increased risk of breast cancer in women on estrogen only therapy, especially after 5 years of use.    The effect of HT on heart disease may vary depending on a woman's age and time since menopause. Per the Menopause Society's position statement, data shows reduced heart disease in women who initiate HT less than 60 years old or are within 10 years of menopause onset. There is more concern for heart disease when HT is initiated in women more than 10-20 years since menopause onset.    In women who are perimenopausal or within 10 years of menopause; the benefits of HT are likely to outweigh the risks.     Alin Fernando, MAYA-CNP 06/24/24 10:45 AM

## 2024-06-25 ENCOUNTER — TELEPHONE (OUTPATIENT)
Dept: OBSTETRICS AND GYNECOLOGY | Facility: CLINIC | Age: 49
End: 2024-06-25
Payer: COMMERCIAL

## 2024-06-25 DIAGNOSIS — Z79.890 MENOPAUSAL SYNDROME ON HORMONE REPLACEMENT THERAPY: ICD-10-CM

## 2024-06-25 DIAGNOSIS — N95.1 MENOPAUSAL SYNDROME ON HORMONE REPLACEMENT THERAPY: ICD-10-CM

## 2024-06-25 RX ORDER — ESTRADIOL 0.05 MG/D
1 PATCH TRANSDERMAL
Qty: 4 PATCH | Refills: 11 | Status: SHIPPED | OUTPATIENT
Start: 2024-06-25 | End: 2025-06-25

## 2024-07-23 DIAGNOSIS — F41.9 ANXIETY: ICD-10-CM

## 2024-07-23 RX ORDER — LORAZEPAM 0.5 MG/1
0.5 TABLET ORAL 2 TIMES DAILY PRN
Qty: 60 TABLET | Refills: 0 | Status: SHIPPED | OUTPATIENT
Start: 2024-07-23 | End: 2024-10-21

## 2024-08-09 ENCOUNTER — APPOINTMENT (OUTPATIENT)
Dept: OBSTETRICS AND GYNECOLOGY | Facility: CLINIC | Age: 49
End: 2024-08-09
Payer: COMMERCIAL

## 2024-08-09 DIAGNOSIS — Z79.890 MENOPAUSAL SYNDROME ON HORMONE REPLACEMENT THERAPY: Primary | ICD-10-CM

## 2024-08-09 DIAGNOSIS — N95.1 MENOPAUSAL SYNDROME ON HORMONE REPLACEMENT THERAPY: Primary | ICD-10-CM

## 2024-08-09 PROCEDURE — 99441 PR PHYS/QHP TELEPHONE EVALUATION 5-10 MIN: CPT | Performed by: NURSE PRACTITIONER

## 2024-08-09 RX ORDER — ESTRADIOL 0.05 MG/D
1 FILM, EXTENDED RELEASE TRANSDERMAL 2 TIMES WEEKLY
Qty: 24 PATCH | Refills: 3 | Status: SHIPPED | OUTPATIENT
Start: 2024-08-09 | End: 2025-08-09

## 2024-08-09 RX ORDER — ESTRADIOL 0.05 MG/D
1 FILM, EXTENDED RELEASE TRANSDERMAL 2 TIMES WEEKLY
Qty: 8 PATCH | Refills: 11 | Status: SHIPPED | OUTPATIENT
Start: 2024-08-12 | End: 2024-08-09

## 2024-08-09 NOTE — PROGRESS NOTES
"Subjective   Patient ID: Cinthya Thomas is a 49 y.o. female who presents for No chief complaint on file..  HPI  Follow up from 6/2024:  Perimenopausal  VMS: yes  Sleep difficulties: yes; h/o sleep apnea  Mood changes: yes, on wellbutrin   Joint pain: yes  Brain fog/difficulty concentrating: yes    I prescribed her MP 100mg po at HS, estradiol 0.05mg patch  Transdermal estradiol d/t elevated triglycerides    Today she states:  \"Things are going well\"  Occasionally patch doesn't stay on  VMS \"have gone down a ton\"  Sleep: \"I'm sleeping wonderfully\"  Overall very happy with the MHT and no AE    Review of Systems    Objective   Physical Exam    Assessment/Plan   Diagnoses and all orders for this visit:  Menopausal syndrome on hormone replacement therapy  -     estradiol (Vivelle-DOT) 0.05 mg/24 hr patch; Place 1 patch over 96 hours on the skin 2 times a week.       Will change to twice weekly patch to help with the patch adhering  Follow up annually or PRN    MAYA Ochoa-CNP 08/09/24 10:39 AM   "

## 2024-09-18 ENCOUNTER — PATIENT MESSAGE (OUTPATIENT)
Dept: PRIMARY CARE | Facility: CLINIC | Age: 49
End: 2024-09-18
Payer: COMMERCIAL

## 2024-09-18 DIAGNOSIS — F32.A DEPRESSIVE DISORDER: Primary | ICD-10-CM

## 2024-09-18 RX ORDER — DULOXETIN HYDROCHLORIDE 30 MG/1
30 CAPSULE, DELAYED RELEASE ORAL DAILY
Qty: 90 CAPSULE | Refills: 0 | Status: SHIPPED | OUTPATIENT
Start: 2024-09-18 | End: 2024-12-17

## 2024-11-19 ENCOUNTER — APPOINTMENT (OUTPATIENT)
Dept: OBSTETRICS AND GYNECOLOGY | Facility: CLINIC | Age: 49
End: 2024-11-19
Payer: COMMERCIAL

## 2024-11-21 ENCOUNTER — APPOINTMENT (OUTPATIENT)
Dept: OBSTETRICS AND GYNECOLOGY | Facility: CLINIC | Age: 49
End: 2024-11-21
Payer: COMMERCIAL

## 2024-12-26 DIAGNOSIS — F32.A DEPRESSIVE DISORDER: ICD-10-CM

## 2024-12-26 RX ORDER — DULOXETIN HYDROCHLORIDE 30 MG/1
30 CAPSULE, DELAYED RELEASE ORAL DAILY
Qty: 90 CAPSULE | Refills: 0 | Status: SHIPPED | OUTPATIENT
Start: 2024-12-26 | End: 2025-03-26

## 2025-01-07 ENCOUNTER — APPOINTMENT (OUTPATIENT)
Dept: PRIMARY CARE | Facility: CLINIC | Age: 50
End: 2025-01-07
Payer: COMMERCIAL

## 2025-01-07 ENCOUNTER — TELEPHONE (OUTPATIENT)
Dept: PRIMARY CARE | Facility: CLINIC | Age: 50
End: 2025-01-07

## 2025-01-07 VITALS
BODY MASS INDEX: 33.49 KG/M2 | HEART RATE: 94 BPM | WEIGHT: 201 LBS | DIASTOLIC BLOOD PRESSURE: 78 MMHG | SYSTOLIC BLOOD PRESSURE: 118 MMHG | HEIGHT: 65 IN | OXYGEN SATURATION: 94 %

## 2025-01-07 DIAGNOSIS — Z13.1 SCREENING FOR DIABETES MELLITUS: ICD-10-CM

## 2025-01-07 DIAGNOSIS — Z00.00 HEALTH CARE MAINTENANCE: ICD-10-CM

## 2025-01-07 DIAGNOSIS — Z12.31 ENCOUNTER FOR SCREENING MAMMOGRAM FOR MALIGNANT NEOPLASM OF BREAST: ICD-10-CM

## 2025-01-07 DIAGNOSIS — Z13.29 SCREENING FOR THYROID DISORDER: ICD-10-CM

## 2025-01-07 DIAGNOSIS — F41.9 ANXIETY: ICD-10-CM

## 2025-01-07 DIAGNOSIS — Z12.11 COLON CANCER SCREENING: Primary | ICD-10-CM

## 2025-01-07 DIAGNOSIS — Z79.890 MENOPAUSAL SYNDROME ON HORMONE REPLACEMENT THERAPY: ICD-10-CM

## 2025-01-07 DIAGNOSIS — N95.1 MENOPAUSAL SYNDROME ON HORMONE REPLACEMENT THERAPY: ICD-10-CM

## 2025-01-07 DIAGNOSIS — Z13.220 SCREENING CHOLESTEROL LEVEL: ICD-10-CM

## 2025-01-07 DIAGNOSIS — Z71.3 WEIGHT LOSS COUNSELING, ENCOUNTER FOR: ICD-10-CM

## 2025-01-07 DIAGNOSIS — G47.33 OSA (OBSTRUCTIVE SLEEP APNEA): ICD-10-CM

## 2025-01-07 DIAGNOSIS — E55.9 VITAMIN D DEFICIENCY: ICD-10-CM

## 2025-01-07 DIAGNOSIS — F32.A DEPRESSIVE DISORDER: ICD-10-CM

## 2025-01-07 DIAGNOSIS — F41.8 DEPRESSION WITH ANXIETY: ICD-10-CM

## 2025-01-07 DIAGNOSIS — E66.811 OBESITY (BMI 30.0-34.9): ICD-10-CM

## 2025-01-07 DIAGNOSIS — K21.9 GASTROESOPHAGEAL REFLUX DISEASE, UNSPECIFIED WHETHER ESOPHAGITIS PRESENT: ICD-10-CM

## 2025-01-07 PROBLEM — F41.0 PANIC ATTACKS: Status: RESOLVED | Noted: 2023-09-29 | Resolved: 2025-01-07

## 2025-01-07 PROCEDURE — 99214 OFFICE O/P EST MOD 30 MIN: CPT

## 2025-01-07 PROCEDURE — 90656 IIV3 VACC NO PRSV 0.5 ML IM: CPT

## 2025-01-07 PROCEDURE — 3008F BODY MASS INDEX DOCD: CPT

## 2025-01-07 PROCEDURE — 90471 IMMUNIZATION ADMIN: CPT

## 2025-01-07 PROCEDURE — 1036F TOBACCO NON-USER: CPT

## 2025-01-07 PROCEDURE — 99396 PREV VISIT EST AGE 40-64: CPT

## 2025-01-07 RX ORDER — OMEPRAZOLE 20 MG/1
20 CAPSULE, DELAYED RELEASE ORAL DAILY
Qty: 90 CAPSULE | Refills: 3 | Status: SHIPPED | OUTPATIENT
Start: 2025-01-07 | End: 2026-01-07

## 2025-01-07 RX ORDER — BUPROPION HYDROCHLORIDE 300 MG/1
300 TABLET ORAL
Qty: 90 TABLET | Refills: 3 | Status: SHIPPED | OUTPATIENT
Start: 2025-01-07 | End: 2026-01-07

## 2025-01-07 RX ORDER — DULOXETIN HYDROCHLORIDE 30 MG/1
30 CAPSULE, DELAYED RELEASE ORAL DAILY
Qty: 90 CAPSULE | Refills: 3 | Status: SHIPPED | OUTPATIENT
Start: 2025-01-07 | End: 2026-01-07

## 2025-01-07 ASSESSMENT — ENCOUNTER SYMPTOMS
PSYCHIATRIC NEGATIVE: 1
LOSS OF SENSATION IN FEET: 0
OCCASIONAL FEELINGS OF UNSTEADINESS: 0
CARDIOVASCULAR NEGATIVE: 1
ALLERGIC/IMMUNOLOGIC NEGATIVE: 1
HEMATOLOGIC/LYMPHATIC NEGATIVE: 1
DEPRESSION: 0
MUSCULOSKELETAL NEGATIVE: 1
CONSTITUTIONAL NEGATIVE: 1
ENDOCRINE NEGATIVE: 1
GASTROINTESTINAL NEGATIVE: 1
EYES NEGATIVE: 1
NEUROLOGICAL NEGATIVE: 1
RESPIRATORY NEGATIVE: 1

## 2025-01-07 ASSESSMENT — PATIENT HEALTH QUESTIONNAIRE - PHQ9
2. FEELING DOWN, DEPRESSED OR HOPELESS: NOT AT ALL
1. LITTLE INTEREST OR PLEASURE IN DOING THINGS: NOT AT ALL
SUM OF ALL RESPONSES TO PHQ9 QUESTIONS 1 AND 2: 0

## 2025-01-07 NOTE — ASSESSMENT & PLAN NOTE
Continues on duloxetine and Wellbutrin daily. OSBALDO score of 9, Denies any concerns today, feels medication is doing well.

## 2025-01-07 NOTE — ASSESSMENT & PLAN NOTE
Continues on duloxetine and Wellbutrin daily. PHQ score 0, Denies any concerns today, feels medication is doing well.

## 2025-01-07 NOTE — ASSESSMENT & PLAN NOTE
[facility]     19  Suzette Lux : 1980 Sex: male  Age: 41 y.o. Chief Complaint   Patient presents with    Chills    Generalized Body Aches    Fatigue       HPI  Patient presents today to express care complaining of 2 days worth of chills coughing productive for thick brown mucus. Has not checked his temperature but feels that he had fever. Pulse ox is 97% on room air. No history of respiratory issues including pneumonia. No one else around him is been sick. States he has been very uncomfortable with the chilling. Review of Systems   Constitutional: Positive for appetite change, chills, fatigue and fever. Negative for activity change, diaphoresis and unexpected weight change. HENT: Positive for congestion. Negative for ear pain, hearing loss, postnasal drip, rhinorrhea, sinus pain and sore throat. Respiratory: Positive for cough. Negative for shortness of breath and wheezing. Cardiovascular: Negative for chest pain, palpitations and leg swelling. Gastrointestinal: Negative for abdominal pain, constipation, diarrhea, nausea and vomiting. Endocrine: Negative. Genitourinary: Negative for difficulty urinating, dysuria and frequency. Musculoskeletal: Negative for arthralgias, back pain, gait problem and myalgias. Skin: Negative. Neurological: Negative for dizziness, light-headedness and headaches. Hematological: Negative. REST OF PERTINENT ROS GONE OVER AND WAS NEGATIVE. Current Outpatient Medications:     azithromycin (ZITHROMAX) 250 MG tablet, Take 1 tablet by mouth See Admin Instructions for 5 days 500mg on day 1 followed by 250mg on days 2 - 5, Disp: 6 tablet, Rfl: 0  No Known Allergies    Past Medical History:   Diagnosis Date    Cancer ([facility] [address]     Lymphoma     Past Surgical History:   Procedure Laterality Date    TUNNELED VENOUS PORT PLACEMENT      Removed    WRIST FRACTURE SURGERY Right 2017     History reviewed.  No pertinent family Lab work ordered  Mammogram ordered  Colonoscopy ordered  Influenza vaccine in OV today   exudate. Neck: Normal range of motion. Neck supple. No thyromegaly present. Cardiovascular: Normal rate, regular rhythm and normal heart sounds. No murmur heard. Pulmonary/Chest: Effort normal. No respiratory distress. He has no wheezes. Few scattered rhonchi in the bases bilaterally   Abdominal: Soft. Bowel sounds are normal. He exhibits no distension. There is no tenderness. Musculoskeletal: Normal range of motion. He exhibits no edema or tenderness. Lymphadenopathy:     He has no cervical adenopathy. He has no axillary adenopathy. Right: No inguinal adenopathy present. Left: No inguinal adenopathy present. Neurological: He is alert and oriented to person, place, and time. No sensory deficit. He exhibits normal muscle tone. Skin: Skin is warm and dry. No rash noted. Psychiatric: He has a normal mood and affect. His behavior is normal.   Nursing note and vitals reviewed. Assessment and Plan:  Antionette Simpson was seen today for chills, generalized body aches and fatigue. Diagnoses and all orders for this visit:    Cough  -     cefTRIAXone (ROCEPHIN) injection 500 mg  -     azithromycin (ZITHROMAX) 250 MG tablet; Take 1 tablet by mouth See Admin Instructions for 5 days 500mg on day 1 followed by 250mg on days 2 - 5  -     XR CHEST STANDARD (2 VW); Future  -     CBC Auto Differential; Future  -     CULTURE BLOOD #1; Future  -     CULTURE BLOOD #2; Future    Bronchitis  -     cefTRIAXone (ROCEPHIN) injection 500 mg  -     azithromycin (ZITHROMAX) 250 MG tablet; Take 1 tablet by mouth See Admin Instructions for 5 days 500mg on day 1 followed by 250mg on days 2 - 5  -     XR CHEST STANDARD (2 VW); Future  -     CBC Auto Differential; Future  -     CULTURE BLOOD #1; Future  -     CULTURE BLOOD #2; Future    Chills  -     cefTRIAXone (ROCEPHIN) injection 500 mg  -     azithromycin (ZITHROMAX) 250 MG tablet;  Take 1 tablet by mouth See Admin Instructions for 5 days 500mg on day 1 followed by 250mg on days 2 - 5  -     XR CHEST STANDARD (2 VW); Future  -     CBC Auto Differential; Future  -     CULTURE BLOOD #1; Future  -     CULTURE BLOOD #2; Future    Clinically suspecting pneumonia in this gentleman. Into the hospital for stat keep and call chest x-ray. Shot of Rocephin 500 mg. Z-Mir began. I did send him over to the lab for blood cultures and CBC. He does not have a PCP and I told him he will need to follow-up in the next week if not establish with somebody to come back to express care. To the emergency room over the weekend if any worsening. Notify us with problems in the interim. Addendum-I did get a call from radiology chest x-ray was read as negative. No follow-ups on file. Seen By:  Owen Peralta MD      *Document was created using voice recognition software. Note was reviewed however may contain grammatical errors.

## 2025-01-07 NOTE — PATIENT INSTRUCTIONS
I am ordering labs for you to get when you are fasting (meaning nothing to eat or drink for at least 12 hours before, water and black coffee are okay). Once we get the results, someone from the office will call you. If you do not hear from someone within one week of having your blood drawn, please call us [phone] so that we can go over the results.    It is recommend that you consume a balanced diet to include: fruits, a variety of vegetables (dark green, red and orange, legumes like beans and peas, starch, other), grains (at least half of which are whole grains), fat-free or low-fat dairy including milk,cheese, or fortified soy beverages, and proteins such as lean means, poultry, fish, eggs, nuts, seeds.   Limit processed foods, saturated and trans fats, added sugars and sodium (salt).     It is recommended that you get at least 150min exercise every week. More is even better. Moderate activities are activities that get your heart beating faster but you are still able to carry on a conversation. Vigorous activities will have you take breaths after a few words. You should also include two days of muscle strengthening activities to include all major muscle groups (arms, shoulders, chest, abdomen, back, hips and legs)    Benefits of keeping active include:  Lowering your risk of developing type II diabetes and some cancers  Control your blood pressure  Maintain a healthy weight  Improving mood and managing stress  Improving sleep

## 2025-01-07 NOTE — TELEPHONE ENCOUNTER
[facility] says they will not service patient, she was deemed non complaint and she needs to pay the $450 for CPAP machine

## 2025-01-07 NOTE — ASSESSMENT & PLAN NOTE
Had CPAP but has not used for the last two months due to the face mask is not sealing. Needs new face mask and tubing but has been unsuccessful in getting it per patient. Does endorse not using continues to have day time sleepiness, snores at night. Order in to replace mask or have new fitting completed.

## 2025-01-07 NOTE — PROGRESS NOTES
"Subjective   Patient ID: Cinthya Thomas is a 49 y.o. female who presents for Annual Exam (Annual exam, does not take ativan anymore and does not want refills. Discuss weight loss shot and CPAP. Would like colonoscopy or cologuard. ).    Past Medical, Surgical, and Family History reviewed and updated in chart.     Reviewed all medications by prescribing practitioner or clinical pharmacist (such as prescriptions, OTCs, herbal therapies and supplements) and documented in the medical record.    HPI   49 yof in office for annual exam. PMH anxiety, depression    On HRT patches but states perimenopause symptoms have been better. Has been a year last November since last period. Has been using CBD gummies to help also that have helped with symptoms.     Has CPAP machine, stopped using for the last two months due to the face mask is not sealing. Needs new face mask and tubing but has been unsuccessful in getting it per patient. Does endorse not using continues to have day time sleepiness, snores at night.     Discuss weight loss. Endorses has been eating good, increasing activity. Has tried phentermine in the past but would like to try something different. Dicussed GPL1 medication and patient is interested. Education provided on these medications.     Review of Systems   Constitutional: Negative.    HENT: Negative.     Eyes: Negative.    Respiratory: Negative.     Cardiovascular: Negative.    Gastrointestinal: Negative.    Endocrine: Negative.    Genitourinary: Negative.    Musculoskeletal: Negative.    Skin: Negative.    Allergic/Immunologic: Negative.    Neurological: Negative.    Hematological: Negative.    Psychiatric/Behavioral: Negative.     All other systems reviewed and are negative.      Objective   /78   Pulse 94   Ht 1.646 m (5' 4.8\")   Wt 91.2 kg (201 lb)   SpO2 94%   BMI 33.65 kg/m²     Physical Exam  Constitutional:       Appearance: Normal appearance. She is obese.   HENT:      Head: Normocephalic " and atraumatic.      Nose: Nose normal.      Mouth/Throat:      Mouth: Mucous membranes are moist.      Pharynx: Oropharynx is clear.   Eyes:      Pupils: Pupils are equal, round, and reactive to light.   Cardiovascular:      Rate and Rhythm: Normal rate and regular rhythm.      Pulses: Normal pulses.      Heart sounds: Normal heart sounds.   Pulmonary:      Effort: Pulmonary effort is normal.      Breath sounds: Normal breath sounds.   Abdominal:      General: Bowel sounds are normal.      Palpations: Abdomen is soft.   Musculoskeletal:         General: Normal range of motion.      Cervical back: Normal range of motion.   Skin:     General: Skin is warm and dry.   Neurological:      General: No focal deficit present.      Mental Status: She is alert and oriented to person, place, and time.   Psychiatric:         Mood and Affect: Mood normal.         Behavior: Behavior normal.         Thought Content: Thought content normal.         Judgment: Judgment normal.         Assessment/Plan   Problem List Items Addressed This Visit       Anxiety     Continues on duloxetine and Wellbutrin daily. OSBALDO score of 9, Denies any concerns today, feels medication is doing well.          Depressive disorder     Continues on duloxetine and Wellbutrin daily. PHQ score 0, Denies any concerns today, feels medication is doing well.          Relevant Medications    DULoxetine (Cymbalta) 30 mg DR capsule    Vitamin D deficiency     Continues on vitamin D supplement daily. Due to lab work, will order today.          Health care maintenance     Lab work ordered  Mammogram ordered  Colonoscopy ordered  Influenza vaccine in OV today         Relevant Orders    Follow Up In [facility] - PCP - Established    Menopausal syndrome on hormone replacement therapy     Follows with GYN is on HRT and states is doing well.          HADLEY (obstructive sleep apnea)     Had CPAP but has not used for the last two months due to the face mask is not  sealing. Needs new face mask and tubing but has been unsuccessful in getting it per patient. Does endorse not using continues to have day time sleepiness, snores at night. Order in to replace mask or have new fitting completed.          Relevant Orders    Positive Airway Pressure (PAP) Therapy     Other Visit Diagnoses       Colon cancer screening    -  Primary    Relevant Orders    Colonoscopy Screening; Average Risk Patient    Depression with anxiety        Relevant Medications    buPROPion XL (Wellbutrin XL) 300 mg 24 hr tablet    Gastroesophageal reflux disease, unspecified whether esophagitis present        Relevant Medications    omeprazole (PriLOSEC) 20 mg DR capsule    Encounter for screening mammogram for malignant neoplasm of breast        Relevant Orders    BI mammo bilateral screening tomosynthesis    BMI 33.0-33.9,adult        Relevant Medications    tirzepatide, weight loss, (Zepbound) 2.5 mg/0.5 mL injection    tirzepatide, weight loss, (Zepbound) 5 mg/0.5 mL injection (Start on 2/3/2025)    Other Relevant Orders    CBC and Auto Differential    Comprehensive Metabolic Panel    Screening cholesterol level        Relevant Orders    Lipid Panel    Screening for diabetes mellitus        Relevant Orders    Hemoglobin A1C    Screening for thyroid disorder        Relevant Orders    TSH with reflex to Free T4 if abnormal    Weight loss counseling, encounter for        Relevant Medications    tirzepatide, weight loss, (Zepbound) 2.5 mg/0.5 mL injection    tirzepatide, weight loss, (Zepbound) 5 mg/0.5 mL injection (Start on 2/3/2025)    Obesity (BMI 30.0-34.9)        Relevant Medications    tirzepatide, weight loss, (Zepbound) 2.5 mg/0.5 mL injection    tirzepatide, weight loss, (Zepbound) 5 mg/0.5 mL injection (Start on 2/3/2025)           Patient Counseling:  --Nutrition: Stressed importance of moderation in sodium/caffeine intake, saturated fat and cholesterol, caloric balance, sufficient intake of fresh  fruits, vegetables, fiber, calcium, iron  --Exercise: Stressed the importance of regular exercise.   --Substance Abuse: Discussed cessation/primary prevention of tobacco, alcohol, or other drug use; driving or other dangerous activities under the influence; availability of treatment for abuse.    --Sexuality: Discussed sexually transmitted diseases, partner selection, use of condoms, avoidance of unintended pregnancy  and contraceptive alternatives.   --Injury prevention: Discussed safety belts, safety helmets, smoke detector, smoking near bedding or upholstery.   --Dental health: Discussed importance of regular tooth brushing, flossing, and dental visits.  --Immunizations reviewed.  --Discussed benefits of screening colonoscopy.  --After hours service discussed with patient

## 2025-01-07 NOTE — TELEPHONE ENCOUNTER
----- Message from Mindi Martini sent at 1/7/2025  1:36 PM EST -----  I put in an ordered for CPAP supplies for patient can you fax it over please    Quality 431: Preventive Care And Screening: Unhealthy Alcohol Use - Screening: Patient not identified as an unhealthy alcohol user when screened for unhealthy alcohol use using a systematic screening method Detail Level: Detailed Quality 110: Preventive Care And Screening: Influenza Immunization: Influenza Immunization Administered during Influenza season Quality 226: Preventive Care And Screening: Tobacco Use: Screening And Cessation Intervention: Patient screened for tobacco use and is an ex/non-smoker Quality 130: Documentation Of Current Medications In The Medical Record: Current Medications Documented Quality 358: Patient-Centered Surgical Risk Assessment And Communication: Documentation of patient-specific risk assessment with a risk calculator based on multi-institutional clinical data, the specific risk calculator used, and communication of risk assessment from risk calculator with the patient or family.

## 2025-01-08 ENCOUNTER — TELEPHONE (OUTPATIENT)
Facility: CLINIC | Age: 50
End: 2025-01-08
Payer: COMMERCIAL

## 2025-01-13 DIAGNOSIS — N95.1 MENOPAUSAL SYNDROME ON HORMONE REPLACEMENT THERAPY: ICD-10-CM

## 2025-01-13 DIAGNOSIS — Z79.890 MENOPAUSAL SYNDROME ON HORMONE REPLACEMENT THERAPY: ICD-10-CM

## 2025-01-14 RX ORDER — ESTRADIOL 0.05 MG/D
1 FILM, EXTENDED RELEASE TRANSDERMAL 2 TIMES WEEKLY
Qty: 24 PATCH | Refills: 3 | Status: SHIPPED | OUTPATIENT
Start: 2025-01-16 | End: 2026-01-16

## 2025-01-27 NOTE — TELEPHONE ENCOUNTER
[facility] OPEN ACCESS COLONOSCOPY SCREENING FORM       Last Colonoscopy? no  ANESTHESIA SCREENING   1. Height 5'4     Weight 180  BMI 30.8    (Clinic Visit if BMI over 40)   2. Are you on any blood thinning medications including  mg? no  ( Clinic visit if yes)  3. Are you on oxygen at home? no (Clinic visit if yes)   4. Have you seen your primary care provider within the last 12 months? 1.7.2025 (Clinic visit if NO)   5. History of:   Pacemaker/Defibrillator no                          Heart Failure no                         Heart Disease no                          Stroke no   Details of cardiac history: no  (Clinic visit if history of heart failure or new diagnosis/new intervention in last year)     6. Do you have renal failure or require dialysis? no  7. Do you have sleep Apnea? Yes, CPAP  8. Are you on insulin for diabetes? no If yes, patient should discuss valente-procedural medication adjustment with PCP.   9. Are you currently on SGLT2 inhibitors? Zepbound  10. Do you have a history of seizures? no (If YES- indicate recent or NOT recent. Anesthesia to review if recent.)    GI SCREENING   Have you had a positive stool based screening test? (i.e. fecal occult, FIT, or Cologuard) no   ? If yes and pass anesthesia screen, no further questions are needed. Schedule OA procedure.   ? If yes and they do NOT pass anesthesia screen then refer to office for expedited review/visit.   ? If no, proceed to the following GI screening questions to determine if office visit is needed.      If patient answers YES to any of the following please send to the office for an appointment.  1. Do you have chronic diarrhea lasting longer than 3 weeks? no   2. Do you have a large amount of rectal bleeding or frequent rectal bleeding? no  3. Do you have significant, unexplained weight loss? no      Open Access APPROVED yes    Patient scheduled with Dr. Ritchie 2.18.2025. Packet with [employer] sent via [employer]

## 2025-02-10 ENCOUNTER — APPOINTMENT (OUTPATIENT)
Dept: RADIOLOGY | Facility: HOSPITAL | Age: 50
End: 2025-02-10
Payer: COMMERCIAL

## 2025-02-10 DIAGNOSIS — Z71.3 WEIGHT LOSS COUNSELING, ENCOUNTER FOR: ICD-10-CM

## 2025-02-10 DIAGNOSIS — E66.811 OBESITY (BMI 30.0-34.9): ICD-10-CM

## 2025-02-18 ENCOUNTER — ANESTHESIA (OUTPATIENT)
Dept: GASTROENTEROLOGY | Facility: HOSPITAL | Age: 50
End: 2025-02-18
Payer: COMMERCIAL

## 2025-02-18 ENCOUNTER — HOSPITAL ENCOUNTER (OUTPATIENT)
Dept: GASTROENTEROLOGY | Facility: HOSPITAL | Age: 50
Discharge: HOME | End: 2025-02-18
Payer: COMMERCIAL

## 2025-02-18 ENCOUNTER — ANESTHESIA EVENT (OUTPATIENT)
Dept: GASTROENTEROLOGY | Facility: HOSPITAL | Age: 50
End: 2025-02-18
Payer: COMMERCIAL

## 2025-02-18 VITALS
TEMPERATURE: 97.7 F | WEIGHT: 200 LBS | DIASTOLIC BLOOD PRESSURE: 63 MMHG | BODY MASS INDEX: 33.32 KG/M2 | HEART RATE: 62 BPM | RESPIRATION RATE: 12 BRPM | HEIGHT: 65 IN | OXYGEN SATURATION: 99 % | SYSTOLIC BLOOD PRESSURE: 107 MMHG

## 2025-02-18 DIAGNOSIS — Z12.11 COLON CANCER SCREENING: ICD-10-CM

## 2025-02-18 PROCEDURE — 45385 COLONOSCOPY W/LESION REMOVAL: CPT | Performed by: INTERNAL MEDICINE

## 2025-02-18 PROCEDURE — 7100000010 HC PHASE TWO TIME - EACH INCREMENTAL 1 MINUTE

## 2025-02-18 PROCEDURE — 7100000009 HC PHASE TWO TIME - INITIAL BASE CHARGE

## 2025-02-18 PROCEDURE — 3700000001 HC GENERAL ANESTHESIA TIME - INITIAL BASE CHARGE

## 2025-02-18 PROCEDURE — 3700000002 HC GENERAL ANESTHESIA TIME - EACH INCREMENTAL 1 MINUTE

## 2025-02-18 PROCEDURE — 2500000004 HC RX 250 GENERAL PHARMACY W/ HCPCS (ALT 636 FOR OP/ED): Performed by: NURSE ANESTHETIST, CERTIFIED REGISTERED

## 2025-02-18 RX ORDER — SODIUM CHLORIDE 0.9 % (FLUSH) 0.9 %
SYRINGE (ML) INJECTION AS NEEDED
Status: DISCONTINUED | OUTPATIENT
Start: 2025-02-18 | End: 2025-02-18

## 2025-02-18 RX ORDER — LIDOCAINE HYDROCHLORIDE 20 MG/ML
INJECTION, SOLUTION INFILTRATION; PERINEURAL AS NEEDED
Status: DISCONTINUED | OUTPATIENT
Start: 2025-02-18 | End: 2025-02-18

## 2025-02-18 RX ORDER — PROPOFOL 10 MG/ML
INJECTION, EMULSION INTRAVENOUS AS NEEDED
Status: DISCONTINUED | OUTPATIENT
Start: 2025-02-18 | End: 2025-02-18

## 2025-02-18 RX ADMIN — PROPOFOL 100 MG: 10 INJECTION, EMULSION INTRAVENOUS at 09:13

## 2025-02-18 RX ADMIN — PROPOFOL 20 MG: 10 INJECTION, EMULSION INTRAVENOUS at 09:26

## 2025-02-18 RX ADMIN — PROPOFOL 50 MG: 10 INJECTION, EMULSION INTRAVENOUS at 09:17

## 2025-02-18 RX ADMIN — PROPOFOL 30 MG: 10 INJECTION, EMULSION INTRAVENOUS at 09:23

## 2025-02-18 RX ADMIN — LIDOCAINE HYDROCHLORIDE 50 MG: 20 INJECTION, SOLUTION INFILTRATION; PERINEURAL at 09:13

## 2025-02-18 RX ADMIN — PROPOFOL 50 MG: 10 INJECTION, EMULSION INTRAVENOUS at 09:29

## 2025-02-18 RX ADMIN — Medication 10 ML: at 09:17

## 2025-02-18 RX ADMIN — Medication 10 ML: at 09:34

## 2025-02-18 RX ADMIN — PROPOFOL 50 MG: 10 INJECTION, EMULSION INTRAVENOUS at 09:20

## 2025-02-18 RX ADMIN — PROPOFOL 50 MG: 10 INJECTION, EMULSION INTRAVENOUS at 09:32

## 2025-02-18 RX ADMIN — Medication 10 ML: at 09:29

## 2025-02-18 RX ADMIN — PROPOFOL 20 MG: 10 INJECTION, EMULSION INTRAVENOUS at 09:15

## 2025-02-18 RX ADMIN — PROPOFOL 30 MG: 10 INJECTION, EMULSION INTRAVENOUS at 09:19

## 2025-02-18 SDOH — HEALTH STABILITY: MENTAL HEALTH: CURRENT SMOKER: 0

## 2025-02-18 ASSESSMENT — PAIN - FUNCTIONAL ASSESSMENT
PAIN_FUNCTIONAL_ASSESSMENT: 0-10

## 2025-02-18 ASSESSMENT — COLUMBIA-SUICIDE SEVERITY RATING SCALE - C-SSRS
1. IN THE PAST MONTH, HAVE YOU WISHED YOU WERE DEAD OR WISHED YOU COULD GO TO SLEEP AND NOT WAKE UP?: NO
6. HAVE YOU EVER DONE ANYTHING, STARTED TO DO ANYTHING, OR PREPARED TO DO ANYTHING TO END YOUR LIFE?: NO
2. HAVE YOU ACTUALLY HAD ANY THOUGHTS OF KILLING YOURSELF?: NO

## 2025-02-18 ASSESSMENT — PAIN SCALES - GENERAL
PAINLEVEL_OUTOF10: 0 - NO PAIN
PAINLEVEL_OUTOF10: 0 - NO PAIN
PAIN_LEVEL: 0
PAINLEVEL_OUTOF10: 0 - NO PAIN

## 2025-02-18 NOTE — ANESTHESIA POSTPROCEDURE EVALUATION
Patient: Cinthya Thomas    Procedure Summary       Date: 02/18/25 Room / Location: [facility]    Anesthesia Start: 0909 Anesthesia Stop: 0939    Procedure: COLONOSCOPY Diagnosis: Colon cancer screening    Scheduled Providers: Jesús Ritchie MD Responsible Provider: LAURIE Johns    Anesthesia Type: MAC ASA Status: 2            Anesthesia Type: MAC    Vitals Value Taken Time   /50 02/18/25 0939   Temp 36.5 °C (97.7 °F) 02/18/25 0937   Pulse 68 02/18/25 0937   Resp 12 02/18/25 0937   SpO2 99 % 02/18/25 0937       Anesthesia Post Evaluation    Patient location during evaluation: PACU  Patient participation: complete - patient participated  Level of consciousness: sleepy but conscious  Pain score: 0  Pain management: adequate  Airway patency: patent  Cardiovascular status: acceptable and hemodynamically stable  Respiratory status: acceptable, spontaneous ventilation and room air  Hydration status: acceptable  Postoperative Nausea and Vomiting: none        There were no known notable events for this encounter.

## 2025-02-18 NOTE — ANESTHESIA PREPROCEDURE EVALUATION
Patient: Cinthya Thomas    Procedure Information       Date/Time: 02/18/25 0830    Scheduled providers: Jesús Ritchie MD    Procedure: COLONOSCOPY    Location: [facility]            Relevant Problems   Anesthesia  No previous anesthesia      Cardiac   (+) PVC (premature ventricular contraction)      Pulmonary   (+) HADLEY (obstructive sleep apnea)      Neuro   (+) Anxiety   (+) Depressive disorder       Clinical information reviewed:   Tobacco  Allergies  Meds   Med Hx  Surg Hx  OB Status  Fam Hx  Soc   Hx        NPO Detail:  NPO/Void Status  Date of Last Liquid: 02/18/25  Time of Last Liquid: 0400  Date of Last Solid: 02/16/25  Last Intake Type: Clear fluids         Physical Exam    Airway  Mallampati: III  TM distance: >3 FB  Neck ROM: full     Cardiovascular - normal exam     Dental - normal exam     Pulmonary - normal exam     Abdominal   (+) obese             Anesthesia Plan    History of general anesthesia?: no  History of complications of general anesthesia?: unknown/emergency    ASA 2     MAC     The patient is not a current smoker.    intravenous induction   Anesthetic plan and risks discussed with patient.    Plan discussed with CRNA.

## 2025-02-18 NOTE — NURSING NOTE
0937: Patient to bay with anesthesia and surgical team present. Handoff report and plan of care reviewed, all questions answered. VSS.    0940: Patient tolerating sips of ginger ale at this time    0945: Dr Ritchie at bedside    0950: Discharge instructions reviewed with patient and family, no further questions at this time. Printed after visit summary with written instruction provided.    1000: Peripheral IV removed with no complications.     1002: Patient dressed with family assistance.     1007: Patient to main lobby via transport with all belongings in stable condition. Phase 2 complete.

## 2025-02-18 NOTE — H&P
History Of Present Illness  Cinthya Thomas is a 49 y.o. female presenting with need for screening colonoscopy.     Past Medical History  Past Medical History:   Diagnosis Date    Panic anxiety syndrome     Panic attacks 09/29/2023    Personal history of other infectious and parasitic diseases 03/18/2018    History of candidiasis of mouth    PVC (premature ventricular contraction)     Vitamin D deficiency, unspecified 11/20/2020    Vitamin D deficiency     Surgical History  Past Surgical History:   Procedure Laterality Date    BREAST BIOPSY Right      Social History  She reports that she has quit smoking. Her smoking use included cigarettes. She has never used smokeless tobacco. She reports that she does not currently use alcohol. She reports current drug use.    Family History  Family History   Problem Relation Name Age of Onset    Hyperlipidemia Mother      Stroke Father      Hypertension Father      COPD Father      Uterine cancer Maternal Grandmother          Age 80    Heart disease Maternal Grandfather      Diabetes Paternal Grandmother      Stroke Paternal Grandfather      Atrial fibrillation Paternal Grandfather          Allergies  Allergies   Allergen Reactions    Bacitracin Zinc-Polymyxin B Unknown       Physical Exam  Constitutional:       General: She is awake.      Appearance: Normal appearance.   HENT:      Head: Normocephalic and atraumatic.      Nose: Nose normal.      Mouth/Throat:      Mouth: Mucous membranes are moist.   Eyes:      Extraocular Movements: Extraocular movements intact.      Conjunctiva/sclera: Conjunctivae normal.   Neck:      Thyroid: No thyroid mass.      Trachea: Phonation normal.   Cardiovascular:      Rate and Rhythm: Normal rate and regular rhythm.      Heart sounds: Normal heart sounds. No murmur heard.     No gallop.   Pulmonary:      Effort: Pulmonary effort is normal. No respiratory distress.      Breath sounds: Normal air entry. No decreased breath sounds, wheezing,  "rhonchi or rales.   Abdominal:      General: Bowel sounds are normal. There is no distension.      Palpations: Abdomen is soft.      Tenderness: There is no abdominal tenderness.   Musculoskeletal:         General: Normal range of motion.      Cervical back: Neck supple.      Right lower leg: No edema.      Left lower leg: No edema.   Skin:     General: Skin is warm and dry.      Capillary Refill: Capillary refill takes less than 2 seconds.      Coloration: Skin is not jaundiced.   Neurological:      Mental Status: She is alert and oriented to person, place, and time. Mental status is at baseline.      Cranial Nerves: Cranial nerves 2-12 are intact.      Motor: Motor function is intact.   Psychiatric:         Attention and Perception: Attention and perception normal.         Mood and Affect: Mood normal.         Speech: Speech normal.         Behavior: Behavior normal.          Last Recorded Vitals  Blood pressure 128/77, pulse 74, temperature 36.1 °C (97 °F), temperature source Temporal, resp. rate 16, height 1.651 m (5' 5\"), weight 90.7 kg (200 lb), SpO2 99%.    Assessment/Plan   Screening  for colon cancer     Jesús Ritchie MD      "

## 2025-02-24 LAB
LABORATORY COMMENT REPORT: NORMAL
PATH REPORT.FINAL DX SPEC: NORMAL
PATH REPORT.GROSS SPEC: NORMAL
PATH REPORT.RELEVANT HX SPEC: NORMAL
PATH REPORT.TOTAL CANCER: NORMAL

## 2025-02-26 ENCOUNTER — TELEPHONE (OUTPATIENT)
Facility: CLINIC | Age: 50
End: 2025-02-26
Payer: COMMERCIAL

## 2025-02-26 NOTE — TELEPHONE ENCOUNTER
----- Message from JesúsRoberts Chapel sent at 2/25/2025  3:22 PM EST -----    The polyps that I removed during your recent colonoscopy were tubular adenomas on pathology.  This type of polyp is not a cancer, but it is the type of polyp that could grow into a cancer over time.  Any polyps that were removed will not grow into a cancer, but having polyps like this can increase your risk of developing other polyps or eventually a cancer.  I would recommend that you have another colonoscopy in about 5 years to look for other polyps.  Please follow-up with your primary care provider.    If you have any other questions or concerns please do not hesitate to call me at my office at [phone].    Repeat Colonoscopy Interval: 5 years

## 2025-03-03 ENCOUNTER — APPOINTMENT (OUTPATIENT)
Dept: PRIMARY CARE | Facility: CLINIC | Age: 50
End: 2025-03-03
Payer: COMMERCIAL

## 2025-05-14 LAB
ALBUMIN SERPL-MCNC: 4.6 G/DL (ref 3.6–5.1)
ALP SERPL-CCNC: 62 U/L (ref 31–125)
ALT SERPL-CCNC: 21 U/L (ref 6–29)
ANION GAP SERPL CALCULATED.4IONS-SCNC: 10 MMOL/L (CALC) (ref 7–17)
AST SERPL-CCNC: 13 U/L (ref 10–35)
BASOPHILS # BLD AUTO: 89 CELLS/UL (ref 0–200)
BASOPHILS NFR BLD AUTO: 1.5 %
BILIRUB SERPL-MCNC: 0.5 MG/DL (ref 0.2–1.2)
BUN SERPL-MCNC: 14 MG/DL (ref 7–25)
CALCIUM SERPL-MCNC: 10.2 MG/DL (ref 8.6–10.2)
CHLORIDE SERPL-SCNC: 102 MMOL/L (ref 98–110)
CHOLEST SERPL-MCNC: 279 MG/DL
CHOLEST/HDLC SERPL: 5.7 (CALC)
CO2 SERPL-SCNC: 28 MMOL/L (ref 20–32)
CREAT SERPL-MCNC: 0.86 MG/DL (ref 0.5–0.99)
EGFRCR SERPLBLD CKD-EPI 2021: 83 ML/MIN/1.73M2
EOSINOPHIL # BLD AUTO: 183 CELLS/UL (ref 15–500)
EOSINOPHIL NFR BLD AUTO: 3.1 %
ERYTHROCYTE [DISTWIDTH] IN BLOOD BY AUTOMATED COUNT: 13.5 % (ref 11–15)
EST. AVERAGE GLUCOSE BLD GHB EST-MCNC: 108 MG/DL
EST. AVERAGE GLUCOSE BLD GHB EST-SCNC: 6 MMOL/L
GLUCOSE SERPL-MCNC: 87 MG/DL (ref 65–99)
HBA1C MFR BLD: 5.4 %
HCT VFR BLD AUTO: 40.3 % (ref 35–45)
HDLC SERPL-MCNC: 49 MG/DL
HGB BLD-MCNC: 13.4 G/DL (ref 11.7–15.5)
LDLC SERPL CALC-MCNC: 182 MG/DL (CALC)
LYMPHOCYTES # BLD AUTO: 2065 CELLS/UL (ref 850–3900)
LYMPHOCYTES NFR BLD AUTO: 35 %
MCH RBC QN AUTO: 29.8 PG (ref 27–33)
MCHC RBC AUTO-ENTMCNC: 33.3 G/DL (ref 32–36)
MCV RBC AUTO: 89.8 FL (ref 80–100)
MONOCYTES # BLD AUTO: 366 CELLS/UL (ref 200–950)
MONOCYTES NFR BLD AUTO: 6.2 %
NEUTROPHILS # BLD AUTO: 3198 CELLS/UL (ref 1500–7800)
NEUTROPHILS NFR BLD AUTO: 54.2 %
NONHDLC SERPL-MCNC: 230 MG/DL (CALC)
PLATELET # BLD AUTO: 331 THOUSAND/UL (ref 140–400)
PMV BLD REES-ECKER: 10.3 FL (ref 7.5–12.5)
POTASSIUM SERPL-SCNC: 4.8 MMOL/L (ref 3.5–5.3)
PROT SERPL-MCNC: 7.3 G/DL (ref 6.1–8.1)
RBC # BLD AUTO: 4.49 MILLION/UL (ref 3.8–5.1)
SODIUM SERPL-SCNC: 140 MMOL/L (ref 135–146)
TRIGL SERPL-MCNC: 269 MG/DL
TSH SERPL-ACNC: 0.73 MIU/L
WBC # BLD AUTO: 5.9 THOUSAND/UL (ref 3.8–10.8)

## 2025-05-15 ENCOUNTER — APPOINTMENT (OUTPATIENT)
Dept: PRIMARY CARE | Facility: CLINIC | Age: 50
End: 2025-05-15
Payer: COMMERCIAL

## 2025-05-15 VITALS
HEIGHT: 65 IN | HEART RATE: 79 BPM | OXYGEN SATURATION: 98 % | SYSTOLIC BLOOD PRESSURE: 109 MMHG | BODY MASS INDEX: 27.82 KG/M2 | WEIGHT: 167 LBS | DIASTOLIC BLOOD PRESSURE: 74 MMHG

## 2025-05-15 DIAGNOSIS — Z00.00 HEALTH CARE MAINTENANCE: ICD-10-CM

## 2025-05-15 DIAGNOSIS — G47.33 OSA (OBSTRUCTIVE SLEEP APNEA): Primary | ICD-10-CM

## 2025-05-15 DIAGNOSIS — Z71.3 WEIGHT LOSS COUNSELING, ENCOUNTER FOR: ICD-10-CM

## 2025-05-15 DIAGNOSIS — E66.811 OBESITY (BMI 30.0-34.9): ICD-10-CM

## 2025-05-15 PROCEDURE — 99214 OFFICE O/P EST MOD 30 MIN: CPT

## 2025-05-15 PROCEDURE — 1036F TOBACCO NON-USER: CPT

## 2025-05-15 PROCEDURE — 3008F BODY MASS INDEX DOCD: CPT

## 2025-05-15 ASSESSMENT — ENCOUNTER SYMPTOMS
GASTROINTESTINAL NEGATIVE: 1
HEMATOLOGIC/LYMPHATIC NEGATIVE: 1
DEPRESSION: 0
MUSCULOSKELETAL NEGATIVE: 1
PSYCHIATRIC NEGATIVE: 1
CONSTITUTIONAL NEGATIVE: 1
NEUROLOGICAL NEGATIVE: 1
EYES NEGATIVE: 1
ENDOCRINE NEGATIVE: 1
LOSS OF SENSATION IN FEET: 0
ALLERGIC/IMMUNOLOGIC NEGATIVE: 1
CARDIOVASCULAR NEGATIVE: 1
OCCASIONAL FEELINGS OF UNSTEADINESS: 0
RESPIRATORY NEGATIVE: 1

## 2025-05-15 ASSESSMENT — ANXIETY QUESTIONNAIRES
IF YOU CHECKED OFF ANY PROBLEMS ON THIS QUESTIONNAIRE, HOW DIFFICULT HAVE THESE PROBLEMS MADE IT FOR YOU TO DO YOUR WORK, TAKE CARE OF THINGS AT HOME, OR GET ALONG WITH OTHER PEOPLE: NOT DIFFICULT AT ALL
GAD7 TOTAL SCORE: 4
3. WORRYING TOO MUCH ABOUT DIFFERENT THINGS: SEVERAL DAYS
6. BECOMING EASILY ANNOYED OR IRRITABLE: NOT AT ALL
4. TROUBLE RELAXING: SEVERAL DAYS
5. BEING SO RESTLESS THAT IT IS HARD TO SIT STILL: NOT AT ALL
7. FEELING AFRAID AS IF SOMETHING AWFUL MIGHT HAPPEN: NOT AT ALL
2. NOT BEING ABLE TO STOP OR CONTROL WORRYING: SEVERAL DAYS
1. FEELING NERVOUS, ANXIOUS, OR ON EDGE: SEVERAL DAYS

## 2025-05-15 NOTE — PROGRESS NOTES
"Subjective   Patient ID: Cinthya Thomas is a 50 y.o. female who presents for Follow-up (Follow up, patient would like to discuss patch of skin on left leg and intermittent left ear pain.).    Past Medical, Surgical, and Family History reviewed and updated in chart.     Reviewed all medications by prescribing practitioner or clinical pharmacist (such as prescriptions, OTCs, herbal therapies and supplements) and documented in the medical record.    HPI   50 yof in office for follow up on weight loss.   Discussed recent lab work with patient, recommendations given.  Has been doing zepbound since February. No side effects.  201lb, down to 167 lbs, down 37lbs.    Left ear pain intermittently. Does not happened all the time it will come and go. When to dentist they took xrays no teeth concerns.     Review of Systems   Constitutional: Negative.    HENT: Negative.          See HPI   Eyes: Negative.    Respiratory: Negative.     Cardiovascular: Negative.    Gastrointestinal: Negative.    Endocrine: Negative.    Genitourinary: Negative.    Musculoskeletal: Negative.    Skin: Negative.    Allergic/Immunologic: Negative.    Neurological: Negative.    Hematological: Negative.    Psychiatric/Behavioral: Negative.     All other systems reviewed and are negative.      Objective   /74   Pulse 79   Ht 1.651 m (5' 5\")   Wt 75.8 kg (167 lb)   SpO2 98%   BMI 27.79 kg/m²     Physical Exam  Constitutional:       Appearance: Normal appearance.   HENT:      Head:      Jaw: There is normal jaw occlusion.      Left Ear: Tympanic membrane normal.   Cardiovascular:      Rate and Rhythm: Normal rate and regular rhythm.      Pulses: Normal pulses.      Heart sounds: Normal heart sounds.   Pulmonary:      Effort: Pulmonary effort is normal.      Breath sounds: Normal breath sounds.   Neurological:      Mental Status: She is alert and oriented to person, place, and time.         Assessment/Plan   Problem List Items Addressed This " Visit       Health care maintenance    Relevant Orders    Follow Up In [facility] - PCP - Established    HADLEY (obstructive sleep apnea) - Primary    Using CPAP nightly, no concerns.         Relevant Medications    tirzepatide, weight loss, (Zepbound) 5 mg/0.5 mL injection     Other Visit Diagnoses         BMI 33.0-33.9,adult        Relevant Medications    tirzepatide, weight loss, (Zepbound) 5 mg/0.5 mL injection      Weight loss counseling, encounter for        Relevant Medications    tirzepatide, weight loss, (Zepbound) 5 mg/0.5 mL injection      Obesity (BMI 30.0-34.9)        Relevant Medications    tirzepatide, weight loss, (Zepbound) 5 mg/0.5 mL injection

## 2025-05-15 NOTE — PATIENT INSTRUCTIONS
Assessment/Plan   Problem List Items Addressed This Visit       Health care maintenance    Relevant Orders    Follow Up In [facility] - PCP - Established    HADLEY (obstructive sleep apnea) - Primary    Using CPAP nightly, no concerns.         Relevant Medications    tirzepatide, weight loss, (Zepbound) 5 mg/0.5 mL injection     Other Visit Diagnoses         BMI 33.0-33.9,adult        Relevant Medications    tirzepatide, weight loss, (Zepbound) 5 mg/0.5 mL injection      Weight loss counseling, encounter for        Relevant Medications    tirzepatide, weight loss, (Zepbound) 5 mg/0.5 mL injection      Obesity (BMI 30.0-34.9)        Relevant Medications    tirzepatide, weight loss, (Zepbound) 5 mg/0.5 mL injection

## 2025-07-29 ENCOUNTER — APPOINTMENT (OUTPATIENT)
Dept: PRIMARY CARE | Facility: CLINIC | Age: 50
End: 2025-07-29
Payer: COMMERCIAL

## 2025-07-29 ENCOUNTER — TELEPHONE (OUTPATIENT)
Dept: PRIMARY CARE | Facility: CLINIC | Age: 50
End: 2025-07-29

## 2025-07-29 DIAGNOSIS — N39.0 URINARY TRACT INFECTION WITHOUT HEMATURIA, SITE UNSPECIFIED: Primary | ICD-10-CM

## 2025-07-29 RX ORDER — NITROFURANTOIN 25; 75 MG/1; MG/1
100 CAPSULE ORAL 2 TIMES DAILY
Qty: 10 CAPSULE | Refills: 0 | Status: SHIPPED | OUTPATIENT
Start: 2025-07-29 | End: 2025-08-03

## 2025-07-29 NOTE — PROGRESS NOTES
symptoms of pain with urination, blood and cloudy urine, along with a foul order. Patient said is started on Sunday and said symptoms have gotten stronger

## 2025-07-29 NOTE — TELEPHONE ENCOUNTER
Patient called was requesting a medication for uto symptoms of pain with urination, blood and cloudy urine, along with a foul order. Patient said is started on Sunday and said symptoms have gotten stronger     [facility] drug

## 2025-08-15 ENCOUNTER — APPOINTMENT (OUTPATIENT)
Dept: PRIMARY CARE | Facility: CLINIC | Age: 50
End: 2025-08-15
Payer: COMMERCIAL

## 2025-08-15 VITALS
BODY MASS INDEX: 25.33 KG/M2 | HEART RATE: 75 BPM | OXYGEN SATURATION: 98 % | HEIGHT: 65 IN | WEIGHT: 152 LBS | DIASTOLIC BLOOD PRESSURE: 67 MMHG | SYSTOLIC BLOOD PRESSURE: 111 MMHG

## 2025-08-15 DIAGNOSIS — Z00.00 HEALTH CARE MAINTENANCE: ICD-10-CM

## 2025-08-15 DIAGNOSIS — G47.33 OSA (OBSTRUCTIVE SLEEP APNEA): ICD-10-CM

## 2025-08-15 DIAGNOSIS — E78.2 MIXED HYPERLIPIDEMIA: Primary | ICD-10-CM

## 2025-08-15 PROCEDURE — 3008F BODY MASS INDEX DOCD: CPT

## 2025-08-15 PROCEDURE — 99213 OFFICE O/P EST LOW 20 MIN: CPT

## 2025-08-15 ASSESSMENT — ENCOUNTER SYMPTOMS
OCCASIONAL FEELINGS OF UNSTEADINESS: 0
DEPRESSION: 0
ENDOCRINE NEGATIVE: 1
PSYCHIATRIC NEGATIVE: 1
CARDIOVASCULAR NEGATIVE: 1
EYES NEGATIVE: 1
RESPIRATORY NEGATIVE: 1
NEUROLOGICAL NEGATIVE: 1
GASTROINTESTINAL NEGATIVE: 1
CONSTITUTIONAL NEGATIVE: 1
LOSS OF SENSATION IN FEET: 0
HEMATOLOGIC/LYMPHATIC NEGATIVE: 1
MUSCULOSKELETAL NEGATIVE: 1
ALLERGIC/IMMUNOLOGIC NEGATIVE: 1

## 2026-02-17 ENCOUNTER — APPOINTMENT (OUTPATIENT)
Dept: PRIMARY CARE | Facility: CLINIC | Age: 51
End: 2026-02-17
Payer: COMMERCIAL